# Patient Record
Sex: FEMALE | Race: WHITE | NOT HISPANIC OR LATINO | Employment: PART TIME | URBAN - METROPOLITAN AREA
[De-identification: names, ages, dates, MRNs, and addresses within clinical notes are randomized per-mention and may not be internally consistent; named-entity substitution may affect disease eponyms.]

---

## 2017-08-09 ENCOUNTER — OFFICE VISIT (OUTPATIENT)
Dept: FAMILY MEDICINE | Facility: CLINIC | Age: 27
End: 2017-08-09
Payer: COMMERCIAL

## 2017-08-09 VITALS
HEART RATE: 68 BPM | HEIGHT: 70 IN | BODY MASS INDEX: 23.62 KG/M2 | DIASTOLIC BLOOD PRESSURE: 80 MMHG | WEIGHT: 165 LBS | TEMPERATURE: 98.8 F | SYSTOLIC BLOOD PRESSURE: 120 MMHG

## 2017-08-09 DIAGNOSIS — F33.1 MODERATE EPISODE OF RECURRENT MAJOR DEPRESSIVE DISORDER (H): ICD-10-CM

## 2017-08-09 DIAGNOSIS — Z12.4 SCREENING FOR MALIGNANT NEOPLASM OF CERVIX: ICD-10-CM

## 2017-08-09 DIAGNOSIS — Z00.01 ENCOUNTER FOR ROUTINE ADULT HEALTH EXAMINATION WITH ABNORMAL FINDINGS: Primary | ICD-10-CM

## 2017-08-09 DIAGNOSIS — Z11.3 SCREENING EXAMINATION FOR VENEREAL DISEASE: ICD-10-CM

## 2017-08-09 DIAGNOSIS — Z30.41 ENCOUNTER FOR SURVEILLANCE OF CONTRACEPTIVE PILLS: ICD-10-CM

## 2017-08-09 DIAGNOSIS — F41.1 GAD (GENERALIZED ANXIETY DISORDER): ICD-10-CM

## 2017-08-09 PROCEDURE — 99385 PREV VISIT NEW AGE 18-39: CPT | Performed by: FAMILY MEDICINE

## 2017-08-09 PROCEDURE — 87389 HIV-1 AG W/HIV-1&-2 AB AG IA: CPT | Performed by: FAMILY MEDICINE

## 2017-08-09 PROCEDURE — 87591 N.GONORRHOEAE DNA AMP PROB: CPT | Performed by: FAMILY MEDICINE

## 2017-08-09 PROCEDURE — 87491 CHLMYD TRACH DNA AMP PROBE: CPT | Performed by: FAMILY MEDICINE

## 2017-08-09 PROCEDURE — 86780 TREPONEMA PALLIDUM: CPT | Performed by: FAMILY MEDICINE

## 2017-08-09 PROCEDURE — 36415 COLL VENOUS BLD VENIPUNCTURE: CPT | Performed by: FAMILY MEDICINE

## 2017-08-09 PROCEDURE — G0145 SCR C/V CYTO,THINLAYER,RESCR: HCPCS | Performed by: FAMILY MEDICINE

## 2017-08-09 RX ORDER — ESCITALOPRAM OXALATE 10 MG/1
10 TABLET ORAL DAILY
Qty: 90 TABLET | Refills: 0 | Status: SHIPPED | OUTPATIENT
Start: 2017-08-09 | End: 2017-12-06

## 2017-08-09 RX ORDER — ESCITALOPRAM OXALATE 10 MG/1
10 TABLET ORAL DAILY
COMMUNITY
End: 2017-08-09

## 2017-08-09 RX ORDER — DROSPIRENONE AND ETHINYL ESTRADIOL 0.02-3(28)
1 KIT ORAL DAILY
COMMUNITY
End: 2017-08-09

## 2017-08-09 RX ORDER — DROSPIRENONE AND ETHINYL ESTRADIOL 0.02-3(28)
1 KIT ORAL DAILY
Qty: 28 TABLET | Refills: 11 | Status: SHIPPED | OUTPATIENT
Start: 2017-08-09 | End: 2017-12-11

## 2017-08-09 ASSESSMENT — PATIENT HEALTH QUESTIONNAIRE - PHQ9
5. POOR APPETITE OR OVEREATING: SEVERAL DAYS
SUM OF ALL RESPONSES TO PHQ QUESTIONS 1-9: 9

## 2017-08-09 ASSESSMENT — ANXIETY QUESTIONNAIRES
3. WORRYING TOO MUCH ABOUT DIFFERENT THINGS: SEVERAL DAYS
GAD7 TOTAL SCORE: 5
5. BEING SO RESTLESS THAT IT IS HARD TO SIT STILL: SEVERAL DAYS
7. FEELING AFRAID AS IF SOMETHING AWFUL MIGHT HAPPEN: SEVERAL DAYS
2. NOT BEING ABLE TO STOP OR CONTROL WORRYING: NOT AT ALL
1. FEELING NERVOUS, ANXIOUS, OR ON EDGE: SEVERAL DAYS
6. BECOMING EASILY ANNOYED OR IRRITABLE: NOT AT ALL

## 2017-08-09 NOTE — PROGRESS NOTES
SUBJECTIVE:   CC: Tiffanie ZIMMERMAN is an 27 year old woman who presents for preventive health visit.     Physical   Annual:     Getting at least 3 servings of Calcium per day::  Yes    Bi-annual eye exam::  NO    Dental care twice a year::  NO    Sleep apnea or symptoms of sleep apnea::  None    Diet::  Regular (no restrictions)    Frequency of exercise::  2-3 days/week    Duration of exercise::  30-45 minutes    Taking medications regularly::  Yes    Medication side effects::  Not applicable    Additional concerns today::  No    Pap smear done on this date: 5/27/16 (approximately), by this group: [facility], results were Normal.     hpv history , when she was younger, since then normal pap  No other history of contraceptive    ocp since last year    History of depression and anxiety and was switched to lexapro from zoloft for few years and had been doing well on current dose  No side effects  Newly single ,      - canker sore?   -STD Screening    Today's PHQ-2 Score:   PHQ-2 ( 1999 Pfizer) 8/9/2017   Q1: Little interest or pleasure in doing things 1   Q2: Feeling down, depressed or hopeless 1   PHQ-2 Score 2   Q1: Little interest or pleasure in doing things Several days   Q2: Feeling down, depressed or hopeless Several days   PHQ-2 Score 2       Abuse: Current or Past(Physical, Sexual or Emotional)- No  Do you feel safe in your environment - Yes    Social History   Substance Use Topics     Smoking status: Never Smoker     Smokeless tobacco: Never Used     Alcohol use Yes      Comment: 1 glass of wine daily     The patient does not drink >3 drinks per day nor >7 drinks per week.    Reviewed orders with patient.  Reviewed health maintenance and updated orders accordingly - Yes  Labs reviewed in EPIC    Mammogram not appropriate for this patient based on age.    Pertinent mammograms are reviewed under the imaging tab.  History of abnormal Pap smear: NO - age 21-29 PAP every 3 years  "recommended    Reviewed and updated as needed this visit by clinical staff  Tobacco  Allergies  Meds  Med Hx  Surg Hx  Fam Hx  Soc Hx        Reviewed and updated as needed this visit by Provider        History reviewed. No pertinent past medical history.   History reviewed. No pertinent surgical history.  Obstetric History     No data available          ROS:  C: NEGATIVE for fever, chills, change in weight  I: NEGATIVE for worrisome rashes, moles or lesions  E: NEGATIVE for vision changes or irritation  ENT: NEGATIVE for ear, mouth and throat problems  R: NEGATIVE for significant cough or SOB  B: NEGATIVE for masses, tenderness or discharge  CV: NEGATIVE for chest pain, palpitations or peripheral edema  GI: NEGATIVE for nausea, abdominal pain, heartburn, or change in bowel habits  : NEGATIVE for unusual urinary or vaginal symptoms. Periods are regular.  M: NEGATIVE for significant arthralgias or myalgia  N: NEGATIVE for weakness, dizziness or paresthesias  P: NEGATIVE for changes in mood or affect     OBJECTIVE:   /80 (BP Location: Right arm, Patient Position: Chair, Cuff Size: Adult Regular)  Pulse 68  Temp 98.8  F (37.1  C) (Oral)  Ht 5' 10\" (1.778 m)  Wt 165 lb (74.8 kg)  BMI 23.68 kg/m2  EXAM:  GENERAL: healthy, alert and no distress  EYES: Eyes grossly normal to inspection, PERRL and conjunctivae and sclerae normal  HENT: ear canals and TM's normal, nose and mouth without ulcers or lesions  NECK: no adenopathy, no asymmetry, masses, or scars and thyroid normal to palpation  RESP: lungs clear to auscultation - no rales, rhonchi or wheezes  BREAST: normal without masses, tenderness or nipple discharge and no palpable axillary masses or adenopathy  CV: regular rate and rhythm, normal S1 S2, no S3 or S4, no murmur, click or rub, no peripheral edema and peripheral pulses strong  ABDOMEN: soft, nontender, no hepatosplenomegaly, no masses and bowel sounds normal   (female): normal female external " "genitalia, normal urethral meatus, vaginal mucosa pink, moist, well rugated, and normal cervix/adnexa/uterus without masses or discharge  MS: no gross musculoskeletal defects noted, no edema  SKIN: no suspicious lesions or rashes  NEURO: Normal strength and tone, mentation intact and speech normal  PSYCH: mentation appears normal, affect normal/bright    ASSESSMENT/PLAN:       ICD-10-CM    1. Encounter for routine adult health examination with abnormal findings Z00.01    2. Moderate episode of recurrent major depressive disorder (H) F33.1 escitalopram (LEXAPRO) 10 MG tablet   3. JOSÉ MIGUEL (generalized anxiety disorder) F41.1 escitalopram (LEXAPRO) 10 MG tablet   4. Encounter for surveillance of contraceptive pills Z30.41 drospirenone-ethinyl estradiol (VESTURA) 3-0.02 MG per tablet   5. Screening for malignant neoplasm of cervix Z12.4 Pap imaged thin layer screen reflex to HPV if ASCUS - recommend age 25 - 29   6. Screening examination for venereal disease Z11.3 HIV Antigen Antibody Combo     Anti Treponema     Chlamydia trachomatis PCR     Neisseria gonorrhoeae PCR     New patient to this clinic/provider  History of depression/anxiety -reviewed risks and benefits of meds, advised therapy, declined for now, she would likre refills, reviewed potential risks of med  Refilled. Advised vit D  Std testing done  Fasting next year      COUNSELING:  Reviewed preventive health counseling, as reflected in patient instructions       reports that she has never smoked. She has never used smokeless tobacco.    Estimated body mass index is 23.68 kg/(m^2) as calculated from the following:    Height as of this encounter: 5' 10\" (1.778 m).    Weight as of this encounter: 165 lb (74.8 kg).         Counseling Resources:  ATP IV Guidelines  Pooled Cohorts Equation Calculator  Breast Cancer Risk Calculator  FRAX Risk Assessment  ICSI Preventive Guidelines  Dietary Guidelines for Americans, 2010  USDA's MyPlate  ASA Prophylaxis  Lung CA " Screening    Marj Galvan [facility]

## 2017-08-09 NOTE — Clinical Note
Please abstract the following data from this visit with this patient into the appropriate field in Epic:  Pap smear done on this date: 5/27/16 (approximately), by this group: [facility], results were normal.

## 2017-08-09 NOTE — MR AVS SNAPSHOT
After Visit Summary   8/9/2017    Tiffanie ZIMMERMAN    MRN: [id number]           Patient Information     Date Of Birth          1990        Visit Information        Provider Department      8/9/2017 5:00 PM Marj Galvan DO [facility]        Today's Diagnoses     Screening for malignant neoplasm of cervix    -  1    Moderate episode of recurrent major depressive disorder (H)        JOSÉ MIGUEL (generalized anxiety disorder)        Encounter for surveillance of contraceptive pills        Screening examination for venereal disease          Care Instructions      Preventive Health Recommendations  Female Ages 26 - 39  Yearly exam:   See your health care provider every year in order to    Review health changes.     Discuss preventive care.      Review your medicines if you your doctor has prescribed any.    Until age 30: Get a Pap test every three years (more often if you have had an abnormal result).    After age 30: Talk to your doctor about whether you should have a Pap test every 3 years or have a Pap test with HPV screening every 5 years.   You do not need a Pap test if your uterus was removed (hysterectomy) and you have not had cancer.  You should be tested each year for STDs (sexually transmitted diseases), if you're at risk.   Talk to your provider about how often to have your cholesterol checked.  If you are at risk for diabetes, you should have a diabetes test (fasting glucose).  Shots: Get a flu shot each year. Get a tetanus shot every 10 years.   Nutrition:     Eat at least 5 servings of fruits and vegetables each day.    Eat whole-grain bread, whole-wheat pasta and brown rice instead of white grains and rice.    Talk to your provider about Calcium and Vitamin D.     Lifestyle    Exercise at least 150 minutes a week (30 minutes a day, 5 days of the week). This will help you control your weight and prevent disease.    Limit alcohol to one drink per day.    No smoking.      Wear sunscreen to prevent skin cancer.    See your dentist every six months for an exam and cleaning.    [facility]   Discharged by : Reshma COREA, Certified Medical Assistant (AAMA)August 9, 2017 5:34 PM    Paper scripts provided to patient : none   If you have any questions regarding to your visit please contact your care team:   Team Gold Clinic Hours Telephone Number   Dr. Reshma Esparza PA-C   7am-7pm Monday - Thursday   7am-5pm Fridays  [phone]   (Appointment scheduling available 24/7)   RN Line   [phone] option 2     What options do I have for visits at the clinic other than the traditional office visit?   To expand how we care for you, many of our providers are utilizing electronic visits (e-visits) and telephone visits, when medically appropriate, for interactions with their patients rather than a visit in the clinic. We also offer nurse visits for many medical concerns. Just like any other service, we will bill your insurance company for this type of visit based on time spent on the phone with your provider. Not all insurance companies cover these visits. Please check with your medical insurance if this type of visit is covered. You will be responsible for any charges that are not paid by your insurance.   E-visits via BigRep: generally incur a $35.00 fee.   Telephone visits:   Time spent on the phone: *charged based on time that is spent on the phone in increments of 10 minutes. Estimated cost:   5-10 mins $30.00   11-20 mins. $59.00   21-30 mins. $85.00   Use BigRep (secure email communication and access to your chart) to send your primary care provider a message or make an appointment. Ask someone on your Team how to sign up for BigRep.   For a Price Quote for your services, please call our Consumer Price Line at [phone].   As always, Thank you for trusting us with your health care  needs!                    [facility]:    Scheduling Appointments  [facility]  Call: [phone]    [facility]  Call: [phone]    [facility]  Call: [phone]    WHERE TO GO FOR CARE?    Clinic    Make an appointment if you:       Are sick (cold, cough, flu, sore throat, earache or in pain).       Have a small injury (sprain, small cut, burn or broken bone).       Need a physical exam, Pap smear, vaccine or prescription refill.       Have questions about your health or medicines.    To reach us:      Call [phone] ([phone]). Open 24 hours every day. (For counseling services, call [phone].)    Log into MOBEXO at [employer]. (Visit [email or web address] to create an account.) Hospital emergency room    An emergency is a serious or life- threatening problem that must be treated right away.    Call 419 or get to the hospital if you have:      Very bad or sudden:            - Chest pain or pressure         - Bleeding         - Head or belly pain         - Dizziness or trouble seeing, walking or                          Speaking      Problems breathing      Blood in your vomit or you are coughing up blood      A major injury (knocked out, loss of a finger or limb, rape, broken bone protruding from skin)    A mental health crisis. (Or call the Mental Health Crisis line at 1-618.191.1762 or Suicide Prevention Hotline at 1-385.356.5935.)    Open 24 hours every day. You don't need an appointment.     Urgent care    Visit urgent care for sickness or small injuries when the clinic is closed. You don't need an appointment. To check hours or find an urgent care near you, visit [email or web address]. Online care    Get online care from [email or web address] for more than 70 common problems, like colds, allergies and infections. Open 24 hours every day at: [email or web address]   Need help deciding?    For advice  "about where to be seen, you may call your clinic and ask to speak with a nurse. We're here for you 24 hours every day.         If you are deaf or hard of hearing, please let us know. We provide many free services including sign language interpreters, oral interpreters, TTYs, telephone amplifiers, note takers and written materials.                 Follow-ups after your visit        Who to contact     If you have questions or need follow up information about today's clinic visit or your schedule please contact [facility] directly at [phone].  Normal or non-critical lab and imaging results will be communicated to you by Ink361hart, letter or phone within 4 business days after the clinic has received the results. If you do not hear from us within 7 days, please contact the clinic through [employer] or phone. If you have a critical or abnormal lab result, we will notify you by phone as soon as possible.  Submit refill requests through [employer] or call your pharmacy and they will forward the refill request to us. Please allow 3 business days for your refill to be completed.          Additional Information About Your Visit        Ink361hart Information     [employer] gives you secure access to your electronic health record. If you see a primary care provider, you can also send messages to your care team and make appointments. If you have questions, please call your primary care clinic.  If you do not have a primary care provider, please call [phone] and they will assist you.        Care EveryWhere ID     This is your Care EveryWhere ID. This could be used by other organizations to access your [place] medical records  [id number]        Your Vitals Were     Pulse Temperature Height BMI (Body Mass Index)          68 98.8  F (37.1  C) (Oral) 5' 10\" (1.778 m) 23.68 kg/m2         Blood Pressure from Last 3 Encounters:   08/09/17 120/80    Weight from Last 3 Encounters:   08/09/17 165 lb (74.8 kg)            "   We Performed the Following     Anti Treponema     Chlamydia trachomatis PCR     HIV Antigen Antibody Combo     Neisseria gonorrhoeae PCR     Pap imaged thin layer screen reflex to HPV if ASCUS - recommend age 25 - 29          Where to get your medicines      These medications were [id number] [place] [place] [facility] [address] AT [facility] [address]  [address], [place] [address]    Hours:  24-hours Phone:  [phone]     drospirenone-ethinyl estradiol 3-0.02 MG per tablet    escitalopram 10 MG tablet          Primary Care Provider    None Specified       No primary provider on file.        Equal Access to Services     [facility]: Hadii ghulam Meléndez, waaxda luzairaadaha, qaybta kaalmada roseanne, yoselin watkins . So [facility] [phone].    ATENCIÓN: Si habla español, tiene a oconnor disposición servicios gratuitos de asistencia lingüística. Llame al [phone].    We comply with applicable federal civil rights laws and [place] laws. We do not discriminate on the basis of race, color, national origin, age, disability sex, sexual orientation or gender identity.            Thank you!     Thank you for choosing [facility]  for your care. Our goal is always to provide you with excellent care. Hearing back from our patients is one way we can continue to improve our services. Please take a few minutes to complete the written survey that you may receive in the mail after your visit with us. Thank you!             Your Updated Medication List - Protect others around you: Learn how to safely use, store and throw away your medicines at www.disposemymeds.org.          This list is accurate as of: 8/9/17  5:34 PM.  Always use your most recent med list.                   Brand Name Dispense Instructions for use Diagnosis    drospirenone-ethinyl estradiol 3-0.02 MG per tablet    VESTURA    28 tablet    Take 1 tablet by mouth  daily    Encounter for surveillance of contraceptive pills       escitalopram 10 MG tablet    LEXAPRO    90 tablet    Take 1 tablet (10 mg) by mouth daily    Moderate episode of recurrent major depressive disorder (H), JOSÉ MIGUEL (generalized anxiety disorder)

## 2017-08-09 NOTE — PATIENT INSTRUCTIONS
Preventive Health Recommendations  Female Ages 26 - 39  Yearly exam:   See your health care provider every year in order to    Review health changes.     Discuss preventive care.      Review your medicines if you your doctor has prescribed any.    Until age 30: Get a Pap test every three years (more often if you have had an abnormal result).    After age 30: Talk to your doctor about whether you should have a Pap test every 3 years or have a Pap test with HPV screening every 5 years.   You do not need a Pap test if your uterus was removed (hysterectomy) and you have not had cancer.  You should be tested each year for STDs (sexually transmitted diseases), if you're at risk.   Talk to your provider about how often to have your cholesterol checked.  If you are at risk for diabetes, you should have a diabetes test (fasting glucose).  Shots: Get a flu shot each year. Get a tetanus shot every 10 years.   Nutrition:     Eat at least 5 servings of fruits and vegetables each day.    Eat whole-grain bread, whole-wheat pasta and brown rice instead of white grains and rice.    Talk to your provider about Calcium and Vitamin D.     Lifestyle    Exercise at least 150 minutes a week (30 minutes a day, 5 days of the week). This will help you control your weight and prevent disease.    Limit alcohol to one drink per day.    No smoking.     Wear sunscreen to prevent skin cancer.    See your dentist every six months for an exam and cleaning.    [facility]   Discharged by : Reshma COREA Certified Medical Assistant (AAMA)August 9, 2017 5:34 PM    Paper scripts provided to patient : none   If you have any questions regarding to your visit please contact your care team:   Team Gold Clinic Hours Telephone Number   Dr. Reshma Esparza PA-C   7am-7pm Monday - Thursday   7am-5pm Fridays  [phone]   (Appointment scheduling available 24/7)   RN Line   [phone] option  2     What options do I have for visits at the clinic other than the traditional office visit?   To expand how we care for you, many of our providers are utilizing electronic visits (e-visits) and telephone visits, when medically appropriate, for interactions with their patients rather than a visit in the clinic. We also offer nurse visits for many medical concerns. Just like any other service, we will bill your insurance company for this type of visit based on time spent on the phone with your provider. Not all insurance companies cover these visits. Please check with your medical insurance if this type of visit is covered. You will be responsible for any charges that are not paid by your insurance.   E-visits via Berg: generally incur a $35.00 fee.   Telephone visits:   Time spent on the phone: *charged based on time that is spent on the phone in increments of 10 minutes. Estimated cost:   5-10 mins $30.00   11-20 mins. $59.00   21-30 mins. $85.00   Use [employer]osst (secure email communication and access to your chart) to send your primary care provider a message or make an appointment. Ask someone on your Team how to sign up for Berg.   For a Price Quote for your services, please call our Consumer Price Line at [phone].   As always, Thank you for trusting us with your health care needs!                    [facility]:    Scheduling Appointments  [place]  Call: [phone]    [facility]  Call: [phone]    [facility]  Call: [phone]    WHERE TO GO FOR CARE?    Clinic    Make an appointment if you:       Are sick (cold, cough, flu, sore throat, earache or in pain).       Have a small injury (sprain, small cut, burn or broken bone).       Need a physical exam, Pap smear, vaccine or prescription refill.       Have questions about your health or medicines.    To reach us:      Call [phone]  ([phone]). Open 24 hours every day. (For counseling services, call [phone].)    Log into [employer] at [employer]. (Visit Spotted[employer] to create an account.) Hospital emergency room    An emergency is a serious or life- threatening problem that must be treated right away.    Call 911 or get to the hospital if you have:      Very bad or sudden:            - Chest pain or pressure         - Bleeding         - Head or belly pain         - Dizziness or trouble seeing, walking or                          Speaking      Problems breathing      Blood in your vomit or you are coughing up blood      A major injury (knocked out, loss of a finger or limb, rape, broken bone protruding from skin)    A mental health crisis. (Or call the Mental Health Crisis line at 1-659.273.4266 or Suicide Prevention Hotline at 1-709.629.1026.)    Open 24 hours every day. You don't need an appointment.     Urgent care    Visit urgent care for sickness or small injuries when the clinic is closed. You don't need an appointment. To check hours or find an urgent care near you, visit [email or web address]. Online care    Get online care from OnCare.org for more than 70 common problems, like colds, allergies and infections. Open 24 hours every day at: [email or web address]   Need help deciding?    For advice about where to be seen, you may call your clinic and ask to speak with a nurse. We're here for you 24 hours every day.         If you are deaf or hard of hearing, please let us know. We provide many free services including sign language interpreters, oral interpreters, TTYs, telephone amplifiers, note takers and written materials.

## 2017-08-09 NOTE — NURSING NOTE
"Chief Complaint   Patient presents with     Physical       Initial /80 (BP Location: Right arm, Patient Position: Chair, Cuff Size: Adult Regular)  Pulse 68  Temp 98.8  F (37.1  C) (Oral)  Ht 5' 10\" (1.778 m)  Wt 165 lb (74.8 kg)  BMI 23.68 kg/m2 Estimated body mass index is 23.68 kg/(m^2) as calculated from the following:    Height as of this encounter: 5' 10\" (1.778 m).    Weight as of this encounter: 165 lb (74.8 kg).  Medication Reconciliation: complete    "

## 2017-08-10 LAB — HIV 1+2 AB+HIV1 P24 AG SERPL QL IA: NORMAL

## 2017-08-10 ASSESSMENT — ANXIETY QUESTIONNAIRES: GAD7 TOTAL SCORE: 5

## 2017-08-11 LAB
C TRACH DNA SPEC QL NAA+PROBE: NORMAL
N GONORRHOEA DNA SPEC QL NAA+PROBE: NORMAL
SPECIMEN SOURCE: NORMAL
SPECIMEN SOURCE: NORMAL
T PALLIDUM IGG+IGM SER QL: NEGATIVE

## 2017-08-14 LAB
COPATH REPORT: NORMAL
PAP: NORMAL

## 2017-08-26 ENCOUNTER — HEALTH MAINTENANCE LETTER (OUTPATIENT)
Age: 27
End: 2017-08-26

## 2017-12-06 DIAGNOSIS — F41.1 GAD (GENERALIZED ANXIETY DISORDER): ICD-10-CM

## 2017-12-06 DIAGNOSIS — F33.1 MODERATE EPISODE OF RECURRENT MAJOR DEPRESSIVE DISORDER (H): ICD-10-CM

## 2017-12-06 NOTE — TELEPHONE ENCOUNTER
escitalopram (LEXAPRO) 10 MG tablet   10 mg, DAILY 0 ordered  Edit     Summary: Take 1 tablet (10 mg) by mouth daily, Disp-90 tablet, R-0, E-Prescribe   Dose, Route, Frequency: 10 mg, Oral, DAILY  Start: 8/9/2017  Ord/Sold: 8/9/2017 (O)  Report  Taking:   Long-term:   Pharmacy: [facility] [address] [place] [address] [address] [facility] [address]  Med Dose History       Patient Sig: Take 1 tablet (10 mg) by mouth daily       Ordered on: 8/9/2017       Authorized by: MERCEDES SAGASTUME       Dispense: 90 tablet        LOV:8/9/2017

## 2017-12-11 ENCOUNTER — MYC MEDICAL ADVICE (OUTPATIENT)
Dept: FAMILY MEDICINE | Facility: CLINIC | Age: 27
End: 2017-12-11

## 2017-12-11 ENCOUNTER — MYC REFILL (OUTPATIENT)
Dept: FAMILY MEDICINE | Facility: CLINIC | Age: 27
End: 2017-12-11

## 2017-12-11 DIAGNOSIS — F33.1 MODERATE EPISODE OF RECURRENT MAJOR DEPRESSIVE DISORDER (H): ICD-10-CM

## 2017-12-11 DIAGNOSIS — F41.1 GAD (GENERALIZED ANXIETY DISORDER): ICD-10-CM

## 2017-12-11 DIAGNOSIS — Z30.41 ENCOUNTER FOR SURVEILLANCE OF CONTRACEPTIVE PILLS: ICD-10-CM

## 2017-12-11 RX ORDER — ESCITALOPRAM OXALATE 10 MG/1
10 TABLET ORAL DAILY
Qty: 90 TABLET | Refills: 0 | Status: CANCELLED | OUTPATIENT
Start: 2017-12-11

## 2017-12-11 RX ORDER — DROSPIRENONE AND ETHINYL ESTRADIOL 0.02-3(28)
1 KIT ORAL DAILY
Qty: 84 TABLET | Refills: 2 | Status: SHIPPED | OUTPATIENT
Start: 2017-12-11 | End: 2018-08-25

## 2017-12-11 RX ORDER — ESCITALOPRAM OXALATE 10 MG/1
10 TABLET ORAL DAILY
Qty: 90 TABLET | Refills: 1 | Status: SHIPPED | OUTPATIENT
Start: 2017-12-11 | End: 2018-09-06

## 2017-12-11 RX ORDER — ESCITALOPRAM OXALATE 10 MG/1
TABLET ORAL
Qty: 90 TABLET | Refills: 0 | Status: SHIPPED | OUTPATIENT
Start: 2017-12-11 | End: 2017-12-11

## 2017-12-11 NOTE — TELEPHONE ENCOUNTER
Prescription approved per [facility] Refill Protocol. Resent birth control with a different quantity. Sent MyChart.  Ines Holloway RN

## 2017-12-11 NOTE — TELEPHONE ENCOUNTER
Route to refill depression meds for one year from visit date of 8/9 per patient's request.  Ines Holloway RN

## 2017-12-11 NOTE — TELEPHONE ENCOUNTER
Prescription approved per [facility] Refill Protocol.  Ines Holloway RN

## 2018-08-25 ENCOUNTER — TELEPHONE (OUTPATIENT)
Dept: FAMILY MEDICINE | Facility: CLINIC | Age: 28
End: 2018-08-25

## 2018-08-25 DIAGNOSIS — Z30.41 ENCOUNTER FOR SURVEILLANCE OF CONTRACEPTIVE PILLS: ICD-10-CM

## 2018-08-27 RX ORDER — DROSPIRENONE AND ETHINYL ESTRADIOL TABLETS 0.02-3(28)
KIT ORAL
Qty: 28 TABLET | Refills: 0 | Status: SHIPPED | OUTPATIENT
Start: 2018-08-27 | End: 2018-08-28

## 2018-08-27 NOTE — TELEPHONE ENCOUNTER
"Requested Prescriptions   Pending Prescriptions Disp Refills     LORYNA 3-0.02 MG per tablet [Pharmacy Med Name: LORYNA 3MG/0.02MG TABLETS 28'S]  Last Written Prescription Date:  12/11/2017  Last Fill Quantity: 84 tablet,  # refills: 2   Last office visit: 8/9/2017 with prescribing provider:  PEREZ Galvan   Future Office Visit:     84 tablet 0     Sig: TAKE 1 TABLET BY MOUTH DAILY    Contraceptives Protocol Failed    8/25/2018 10:06 AM       Failed - Recent (12 mo) or future (30 days) visit within the authorizing provider's specialty    Patient had office visit in the last 12 months or has a visit in the next 30 days with authorizing provider or within the authorizing provider's specialty.  See \"Patient Info\" tab in inbasket, or \"Choose Columns\" in Meds & Orders section of the refill encounter.           Passed - Patient is not a current smoker if age is 35 or older       Passed - No active pregnancy on record       Passed - No positive pregnancy test in past 12 months          "

## 2018-08-27 NOTE — TELEPHONE ENCOUNTER
Rayne given x1 today. Patient needs office visit, please schedule. Patient is due for annual visit.    Thanks!  Manuel Carvalho RN

## 2018-08-28 ENCOUNTER — OFFICE VISIT (OUTPATIENT)
Dept: FAMILY MEDICINE | Facility: CLINIC | Age: 28
End: 2018-08-28
Payer: COMMERCIAL

## 2018-08-28 VITALS
SYSTOLIC BLOOD PRESSURE: 106 MMHG | RESPIRATION RATE: 18 BRPM | DIASTOLIC BLOOD PRESSURE: 74 MMHG | HEART RATE: 67 BPM | WEIGHT: 161.2 LBS | OXYGEN SATURATION: 99 % | TEMPERATURE: 97.6 F | BODY MASS INDEX: 23.13 KG/M2

## 2018-08-28 DIAGNOSIS — H10.31 ACUTE CONJUNCTIVITIS OF RIGHT EYE, UNSPECIFIED ACUTE CONJUNCTIVITIS TYPE: ICD-10-CM

## 2018-08-28 DIAGNOSIS — Z30.41 ENCOUNTER FOR SURVEILLANCE OF CONTRACEPTIVE PILLS: Primary | ICD-10-CM

## 2018-08-28 PROCEDURE — 99213 OFFICE O/P EST LOW 20 MIN: CPT | Performed by: INTERNAL MEDICINE

## 2018-08-28 RX ORDER — DROSPIRENONE AND ETHINYL ESTRADIOL 0.02-3(28)
1 KIT ORAL DAILY
Qty: 28 TABLET | Refills: 11 | Status: SHIPPED | OUTPATIENT
Start: 2018-08-28 | End: 2019-03-11

## 2018-08-28 RX ORDER — TOBRAMYCIN 3 MG/ML
1 SOLUTION/ DROPS OPHTHALMIC EVERY 4 HOURS
Qty: 1 BOTTLE | Refills: 0 | Status: SHIPPED | OUTPATIENT
Start: 2018-08-28 | End: 2018-09-04

## 2018-08-28 ASSESSMENT — ENCOUNTER SYMPTOMS: EYE PAIN: 1

## 2018-08-28 NOTE — PROGRESS NOTES
This patient was initially scheduled for a complete physical exam but we did not do a complete physical exam today. This was a problem focused visit for    1. Evaluation of possible pink eye with right eye  2. Refill of birth control      See assessment and plan section  . None of this complete physical exam visit was performed and patient did not want this visit to be a complete physical exam. She's  in excellent health and declines any screening  Laboratory studies and had her pap and pelvic examination last year      HPI        Today's PHQ-2 Score:   PHQ-2 ( 1999 [employer]) 8/28/2018   Q1: Little interest or pleasure in doing things 0   Q2: Feeling down, depressed or hopeless 1   PHQ-2 Score 1   Q1: Little interest or pleasure in doing things Not at all   Q2: Feeling down, depressed or hopeless Several days   PHQ-2 Score 1       Abuse: Current or Past(Physical, Sexual or Emotional)- No  Do you feel safe in your environment - No    Social History   Substance Use Topics     Smoking status: Never Smoker     Smokeless tobacco: Never Used     Alcohol use Yes      Comment: 1 glass of wine daily     Alcohol Use 8/28/2018   If you drink alcohol do you typically have greater than 3 drinks per day OR greater than 7 drinks per week? No   No flowsheet data found.    Reviewed orders with patient.  Reviewed health maintenance and updated orders accordingly -       Mammogram not appropriate for this patient based on age.    Pertinent mammograms are reviewed under the imaging tab.  History of abnormal Pap smear:   PAP / HPV 8/9/2017   PAP NIL     Reviewed and updated as needed this visit by clinical staff         Reviewed and updated as needed this visit by Provider            Review of Systems   Eyes: Positive for pain.        OBJECTIVE:   /74  Pulse 67  Temp 97.6  F (36.4  C) (Oral)  Resp 18  Wt 161 lb 3.2 oz (73.1 kg)  SpO2 99%  BMI 23.13 kg/m2  Physical Exam      Diagnostic Test Results:  none     ASSESSMENT/PLAN:    (Z30.41) Encounter for surveillance of contraceptive pills  (primary encounter diagnosis)  Comment: no new issues to discuss, patient tolerates birth control  And needs a refill   Plan: drospirenone-ethinyl estradiol (LORYNA) 3-0.02         MG per tablet            (H10.31) Acute conjunctivitis of right eye, unspecified acute conjunctivitis type  Comment: patient has a history of eye lash extensions and possibly has had this as a source of some eye irritation. Her concerns revolve around some pinkish eye glued shut with goopy material mainly this am. She's had this condition before to the point that she did see an ophthalmologist she says some months ago and has some recurrent eye symptoms. The exam was showing some nonspecific minor reddish irritation with the conjunctiva of right eye. Not consistent with a full blown bacterial pink eye and more of an irritant conjunctivitis. She says she was told she had recurrent viral conjunctivitis. At this point I recommended proper eye lubrication with use of Visine eye drops and TOBRAMYCIN eye drops just for the next few days . If we don't get this condition to an improvement, further eye care with ophthalmologist will ne necessary as this is a recurrent problem. Patient understands and will follow up with ophthalmologist if persistent eye symptoms    Plan: tobramycin (TOBREX) 0.3 % ophthalmic solution

## 2018-08-28 NOTE — PATIENT INSTRUCTIONS
[facility]    If you have any questions regarding to your visit please contact your care team:     Team Pink:   Clinic Hours Telephone Number   Internal Medicine:  Dr. Tesha Murrell NP       7am-7pm  Monday - Thursday   7am-5pm  Fridays  [phone]  (Appointment scheduling available 24/7)    Questions about your recent visit?  Team Line  [phone]   Urgent Care - [place] and [facility] - 11am-9pm Monday-Friday Saturday-Sunday- 9am-5pm   [place] - 5pm-9pm Monday-Friday Saturday-Sunday- 9am-5pm  [phone] - Ileana Hand  [phone] - [place]       What options do I have for a visit other than an office visit? We offer electronic visits (e-visits) and telephone visits, when medically appropriate.  Please check with your medical insurance to see if these types of visits are covered, as you will be responsible for any charges that are not paid by your insurance.      You can use Whisk (formerly Zypsee) (secure electronic communication) to access to your chart, send your primary care provider a message, or make an appointment. Ask a team member how to get started.     For a price quote for your services, please call our Consumer Price Line at [phone] or our Imaging Cost estimation line at [phone] (for imaging tests).  Discharged by Missy MORATAYA CMA ([facility])

## 2018-08-28 NOTE — MR AVS SNAPSHOT
After Visit Summary   8/28/2018    Tiffanie ZIMMERMAN    MRN: [id number]           Patient Information     Date Of Birth          1990        Visit Information        Provider Department      8/28/2018 11:10 AM Catlaino Lara MD [facility]        Today's Diagnoses     Encounter for surveillance of contraceptive pills    -  1    Acute conjunctivitis of right eye, unspecified acute conjunctivitis type          Care Instructions    [facility]    If you have any questions regarding to your visit please contact your care team:     Team Pink:   Clinic Hours Telephone Number   Internal Medicine:  Dr. Tesha Murrell NP       7am-7pm  Monday - Thursday   7am-5pm  Fridays  [phone]  (Appointment scheduling available 24/7)    Questions about your recent visit?  Team Line  [phone]   Urgent Care - [place] and [facility] - 11am-9pm Monday-Friday Saturday-Sunday- 9am-5pm   [place] - 5pm-9pm Monday-Friday Saturday-Sunday- 9am-5pm  [phone] - [place]  [phone] - [place]       What options do I have for a visit other than an office visit? We offer electronic visits (e-visits) and telephone visits, when medically appropriate.  Please check with your medical insurance to see if these types of visits are covered, as you will be responsible for any charges that are not paid by your insurance.      You can use InteKrin (secure electronic communication) to access to your chart, send your primary care provider a message, or make an appointment. Ask a team member how to get started.     For a price quote for your services, please call our Consumer Price Line at [phone] or our Imaging Cost estimation line at [phone] (for imaging tests).  Discharged by Missy MORATAYA CMA ([facility])            Follow-ups after your visit        Your next 10 appointments already scheduled     Sep 05, 2018  3:15 PM CDT   [facility]  Annual Physical with Giovanni Salomon MD   [facility] ([facility])    [address]  [place] [address] [phone]              Who to contact     If you have questions or need follow up information about today's clinic visit or your schedule please contact [facility] directly at [phone].  Normal or non-critical lab and imaging results will be communicated to you by RF nanohart, letter or phone within 4 business days after the clinic has received the results. If you do not hear from us within 7 days, please contact the clinic through RF nanohart or phone. If you have a critical or abnormal lab result, we will notify you by phone as soon as possible.  Submit refill requests through Jamglue or call your pharmacy and they will forward the refill request to us. Please allow 3 business days for your refill to be completed.          Additional Information About Your Visit        RF nanoharSparxent Information     Jamglue gives you secure access to your electronic health record. If you see a primary care provider, you can also send messages to your care team and make appointments. If you have questions, please call your primary care clinic.  If you do not have a primary care provider, please call [phone] and they will assist you.        Care EveryWhere ID     This is your Care EveryWhere ID. This could be used by other organizations to access your [place] medical records  [id number]        Your Vitals Were     Pulse Temperature Respirations Pulse Oximetry BMI (Body Mass Index)       67 97.6  F (36.4  C) (Oral) 18 99% 23.13 kg/m2        Blood Pressure from Last 3 Encounters:   08/28/18 106/74   08/09/17 120/80    Weight from Last 3 Encounters:   08/28/18 161 lb 3.2 oz (73.1 kg)   08/09/17 165 lb (74.8 kg)              Today, you had the following     No orders found for display         Today's Medication Changes          These changes are accurate as of 8/28/18 11:34  AM.  If you have any questions, ask your nurse or doctor.               Start taking these medicines.        Dose/Directions    tobramycin 0.3 % ophthalmic solution   Commonly known as:  TOBREX   Used for:  Acute conjunctivitis of right eye, unspecified acute conjunctivitis type   Started by:  Catalino Lara MD        Dose:  1 drop   Apply 1 drop to eye every 4 hours for 7 days   Quantity:  1 Bottle   Refills:  0            Where to get your medicines      These medications were sent to [facility] [address] [place] [address] [address] AT [facility] [address]  [address], [facility] [address]     Phone:  [phone]     drospirenone-ethinyl estradiol 3-0.02 MG per tablet    tobramycin 0.3 % ophthalmic solution                Primary Care Provider Fax #    Physician No Ref-Primary [phone]       No address on file        Equal Access to Services     [facility] : Hadii aad ku hadasho Sosandip, waaxda luqadaha, qaybta kaalmada adeegyada, yoselin watkins . So [facility] [phone].    ATENCIÓN: Si habla español, tiene a oconnor disposición servicios gratuitos de asistencia lingüística. Erma al [phone].    We comply with applicable federal civil rights laws and [place] laws. We do not discriminate on the basis of race, color, national origin, age, disability, sex, sexual orientation, or gender identity.            Thank you!     Thank you for choosing [facility]  for your care. Our goal is always to provide you with excellent care. Hearing back from our patients is one way we can continue to improve our services. Please take a few minutes to complete the written survey that you may receive in the mail after your visit with us. Thank you!             Your Updated Medication List - Protect others around you: Learn how to safely use, store and throw away your medicines at www.disposemymeds.org.          This list is accurate as of 8/28/18 11:34  AM.  Always use your most recent med list.                   Brand Name Dispense Instructions for use Diagnosis    drospirenone-ethinyl estradiol 3-0.02 MG per tablet    LORYNA    28 tablet    Take 1 tablet by mouth daily    Encounter for surveillance of contraceptive pills       escitalopram 10 MG tablet    LEXAPRO    90 tablet    Take 1 tablet (10 mg) by mouth daily    Moderate episode of recurrent major depressive disorder (H), JOSÉ MIGUEL (generalized anxiety disorder)       tobramycin 0.3 % ophthalmic solution    TOBREX    1 Bottle    Apply 1 drop to eye every 4 hours for 7 days    Acute conjunctivitis of right eye, unspecified acute conjunctivitis type

## 2018-08-29 ASSESSMENT — PATIENT HEALTH QUESTIONNAIRE - PHQ9: SUM OF ALL RESPONSES TO PHQ QUESTIONS 1-9: 0

## 2018-09-06 DIAGNOSIS — F33.1 MODERATE EPISODE OF RECURRENT MAJOR DEPRESSIVE DISORDER (H): ICD-10-CM

## 2018-09-06 DIAGNOSIS — F41.1 GAD (GENERALIZED ANXIETY DISORDER): ICD-10-CM

## 2018-09-06 NOTE — TELEPHONE ENCOUNTER
"Requested Prescriptions   Pending Prescriptions Disp Refills     escitalopram (LEXAPRO) 10 MG tablet [Pharmacy Med Name: ESCITALOPRAM 10MG TABLETS]  Last Written Prescription Date:  12/11/2017  Last Fill Quantity: 90 tabs,  # refills: 1   Last office visit: 8/28/2018 with prescribing provider:  Marco   Future Office Visit:     90 tablet 0     Sig: TAKE 1 TABLET(10 MG) BY MOUTH DAILY    SSRIs Protocol Passed    9/6/2018  5:18 PM   PHQ-9 SCORE 8/9/2017 8/28/2018   Total Score 9 0     JOSÉ MIGUEL-7 SCORE 8/9/2017   Total Score 5           Passed - PHQ-9 score less than 5 in past 6 months    Please review last PHQ-9 score.          Passed - Patient is age 18 or older       Passed - No active pregnancy on record       Passed - No positive pregnancy test in last 12 months       Passed - Recent (6 mo) or future (30 days) visit within the authorizing provider's specialty    Patient had office visit in the last 6 months or has a visit in the next 30 days with authorizing provider or within the authorizing provider's specialty.  See \"Patient Info\" tab in inbasket, or \"Choose Columns\" in Meds & Orders section of the refill encounter.              "

## 2018-09-07 RX ORDER — ESCITALOPRAM OXALATE 10 MG/1
TABLET ORAL
Qty: 90 TABLET | Refills: 1 | Status: SHIPPED | OUTPATIENT
Start: 2018-09-07 | End: 2019-03-05

## 2018-09-07 NOTE — TELEPHONE ENCOUNTER
Depression. Prescription approved per [facility] Refill Protocol.  Ines Holloway RN

## 2018-10-11 DIAGNOSIS — Z30.41 ENCOUNTER FOR SURVEILLANCE OF CONTRACEPTIVE PILLS: ICD-10-CM

## 2018-10-11 RX ORDER — DROSPIRENONE AND ETHINYL ESTRADIOL TABLETS 0.02-3(28)
KIT ORAL
Qty: 28 TABLET | Refills: 0 | OUTPATIENT
Start: 2018-10-11

## 2018-10-11 NOTE — TELEPHONE ENCOUNTER
"Requested Prescriptions   Pending Prescriptions Disp Refills     LORYNA 3-0.02 MG per tablet [Pharmacy Med Name: LORYNA 3MG/0.02MG TABLETS 28'S] 28 tablet 0    Last Written Prescription Date:  8/28/18  Last Fill Quantity: 28,  # refills: 11   Last office visit: 8/28/2018 with prescribing provider:  Marco   Future Office Visit:     Sig: TAKE 1 TABLET BY MOUTH DAILY.    Contraceptives Protocol Failed    10/11/2018  3:53 AM       Failed - Recent (12 mo) or future (30 days) visit within the authorizing provider's specialty    Patient had office visit in the last 12 months or has a visit in the next 30 days with authorizing provider or within the authorizing provider's specialty.  See \"Patient Info\" tab in inbasket, or \"Choose Columns\" in Meds & Orders section of the refill encounter.           Passed - Patient is not a current smoker if age is 35 or older       Passed - No active pregnancy on record       Passed - No positive pregnancy test in past 12 months          "

## 2018-10-11 NOTE — TELEPHONE ENCOUNTER
Duplicate from the same pharmacy and we received a confirmation receipt on 8/12. Sent a message asking if they received it.  Ines Holloway RN

## 2019-03-05 ENCOUNTER — TELEPHONE (OUTPATIENT)
Dept: FAMILY MEDICINE | Facility: CLINIC | Age: 29
End: 2019-03-05

## 2019-03-05 DIAGNOSIS — F33.1 MODERATE EPISODE OF RECURRENT MAJOR DEPRESSIVE DISORDER (H): ICD-10-CM

## 2019-03-05 DIAGNOSIS — F41.1 GAD (GENERALIZED ANXIETY DISORDER): ICD-10-CM

## 2019-03-05 NOTE — LETTER
[facility]  [address] [place] [address]  Phone: [phone]        March 14, 2019      Tiffanie ZIMMERMAN                                                                                                           [address] [place] [address]          Issa Moreno,        We have attempted to reach you regarding your refill request, but have been unsuccessful.    We have received a refill request for one of your medications. We have sent a refill of your medication to your pharmacy, however your provider has noted that you will need to be seen for a follow up visit in order to continue this medication.    Please contact us at [phone] to schedule an appointment with your provider before your next refill is due.      Thank you,      Your Health Care Team at the [facility]

## 2019-03-06 NOTE — TELEPHONE ENCOUNTER
"Requested Prescriptions   Pending Prescriptions Disp Refills     escitalopram (LEXAPRO) 10 MG tablet [Pharmacy Med Name: ESCITALOPRAM 10MG TABLETS]  Last Written Prescription Date:  9/7/2018  Last Fill Quantity: 90 tabs,  # refills: 1   Last office visit: 8/28/2018 with prescribing provider:  Marco   Future Office Visit:     90 tablet 0     Sig: TAKE 1 TABLET(10 MG) BY MOUTH DAILY    SSRIs Protocol Failed - 3/5/2019  8:47 PM   PHQ-9 SCORE 8/9/2017 8/28/2018   PHQ-9 Total Score 9 0     JOSÉ MIGUEL-7 SCORE 8/9/2017   Total Score 5           Failed - PHQ-9 score less than 5 in past 6 months    Please review last PHQ-9 score.          Failed - Recent (6 mo) or future (30 days) visit within the authorizing provider's specialty    Patient had office visit in the last 6 months or has a visit in the next 30 days with authorizing provider or within the authorizing provider's specialty.  See \"Patient Info\" tab in inbasket, or \"Choose Columns\" in Meds & Orders section of the refill encounter.           Passed - Medication is active on med list       Passed - Patient is age 18 or older       Passed - No active pregnancy on record       Passed - No positive pregnancy test in last 12 months          "

## 2019-03-07 RX ORDER — ESCITALOPRAM OXALATE 10 MG/1
TABLET ORAL
Qty: 30 TABLET | Refills: 0 | Status: SHIPPED | OUTPATIENT
Start: 2019-03-07 | End: 2019-04-11

## 2019-03-07 NOTE — TELEPHONE ENCOUNTER
LMOM for patient to call back main clinic number to schedule an appointment with Marj Galvan DO to follow up on depression/anxiety.    Thank you,  Alannah HAYNES    NE Team [place]

## 2019-03-11 ENCOUNTER — MYC REFILL (OUTPATIENT)
Dept: FAMILY MEDICINE | Facility: CLINIC | Age: 29
End: 2019-03-11

## 2019-03-11 DIAGNOSIS — F33.1 MODERATE EPISODE OF RECURRENT MAJOR DEPRESSIVE DISORDER (H): ICD-10-CM

## 2019-03-11 DIAGNOSIS — F41.1 GAD (GENERALIZED ANXIETY DISORDER): ICD-10-CM

## 2019-03-11 NOTE — TELEPHONE ENCOUNTER
"Requested Prescriptions   Pending Prescriptions Disp Refills     escitalopram (LEXAPRO) 10 MG tablet  Last Written Prescription Date:  3/7/2019  Last Fill Quantity: 30 tabs,  # refills: 0   Last office visit: 8/28/2018 with prescribing provider:  Marco   Future Office Visit:     30 tablet 0     Sig: Take 1 tablet (10 mg) by mouth daily    SSRIs Protocol Failed - 3/11/2019  4:13 PM   PHQ-9 SCORE 8/9/2017 8/28/2018   PHQ-9 Total Score 9 0     JOSÉ MIGUEL-7 SCORE 8/9/2017   Total Score 5           Failed - PHQ-9 score less than 5 in past 6 months    Please review last PHQ-9 score.          Failed - Recent (6 mo) or future (30 days) visit within the authorizing provider's specialty    Patient had office visit in the last 6 months or has a visit in the next 30 days with authorizing provider or within the authorizing provider's specialty.  See \"Patient Info\" tab in inbasket, or \"Choose Columns\" in Meds & Orders section of the refill encounter.           Passed - Medication is active on med list       Passed - Patient is age 18 or older       Passed - No active pregnancy on record       Passed - No positive pregnancy test in last 12 months          "

## 2019-03-12 RX ORDER — ESCITALOPRAM OXALATE 10 MG/1
10 TABLET ORAL DAILY
Qty: 30 TABLET | Refills: 0 | Status: CANCELLED | OUTPATIENT
Start: 2019-03-12

## 2019-03-13 NOTE — TELEPHONE ENCOUNTER
Routing to RN, please unsign the order for the medication and close encounter.    Called patient and let her know that we cannot refill her medication unless she is seen.  Advised her that she might be able to get Catalino Lara at the [facility] to refill because she saw them back in 2018 for a physical.    Kelly Silvestre

## 2019-03-14 NOTE — TELEPHONE ENCOUNTER
Called patient and left a VM message to schedule an appointment with Dr Galvan for depression/anxiety check.    Kelly Silvestre

## 2019-04-03 DIAGNOSIS — F33.1 MODERATE EPISODE OF RECURRENT MAJOR DEPRESSIVE DISORDER (H): ICD-10-CM

## 2019-04-03 DIAGNOSIS — F41.1 GAD (GENERALIZED ANXIETY DISORDER): ICD-10-CM

## 2019-04-03 NOTE — TELEPHONE ENCOUNTER
"Requested Prescriptions   Pending Prescriptions Disp Refills     escitalopram (LEXAPRO) 10 MG tablet [Pharmacy Med Name: ESCITALOPRAM 10MG TABLETS]  Last Written Prescription Date:  3/7/19  Last Fill Quantity: 30,  # refills: 0   Last office visit: 8/28/2018 with prescribing provider:  Marco   Future Office Visit:     30 tablet 0     Sig: TAKE 1 TABLET BY MOUTH DAILY.    SSRIs Protocol Failed - 4/3/2019 11:10 AM       Failed - PHQ-9 score less than 5 in past 6 months    Please review last PHQ-9 score.          Failed - Recent (6 mo) or future (30 days) visit within the authorizing provider's specialty    Patient had office visit in the last 6 months or has a visit in the next 30 days with authorizing provider or within the authorizing provider's specialty.  See \"Patient Info\" tab in inbasket, or \"Choose Columns\" in Meds & Orders section of the refill encounter.           Passed - Medication is active on med list       Passed - Patient is age 18 or older       Passed - No active pregnancy on record       Passed - No positive pregnancy test in last 12 months          "

## 2019-04-04 RX ORDER — ESCITALOPRAM OXALATE 10 MG/1
TABLET ORAL
Qty: 30 TABLET | Refills: 0 | OUTPATIENT
Start: 2019-04-04

## 2019-04-10 NOTE — TELEPHONE ENCOUNTER
4.10.19 - Patient called in to request the refill for LEXAPRO be filled since she has an appointment scheduled in June. Please call back to advise.

## 2019-04-11 ENCOUNTER — MYC REFILL (OUTPATIENT)
Dept: FAMILY MEDICINE | Facility: CLINIC | Age: 29
End: 2019-04-11

## 2019-04-11 ENCOUNTER — TELEPHONE (OUTPATIENT)
Dept: FAMILY MEDICINE | Facility: CLINIC | Age: 29
End: 2019-04-11

## 2019-04-11 DIAGNOSIS — F41.1 GAD (GENERALIZED ANXIETY DISORDER): ICD-10-CM

## 2019-04-11 DIAGNOSIS — F33.1 MODERATE EPISODE OF RECURRENT MAJOR DEPRESSIVE DISORDER (H): ICD-10-CM

## 2019-04-11 RX ORDER — ESCITALOPRAM OXALATE 10 MG/1
10 TABLET ORAL DAILY
Qty: 90 TABLET | Refills: 0 | Status: CANCELLED | OUTPATIENT
Start: 2019-04-11

## 2019-04-11 RX ORDER — ESCITALOPRAM OXALATE 10 MG/1
10 TABLET ORAL DAILY
Qty: 90 TABLET | Refills: 0 | Status: SHIPPED | OUTPATIENT
Start: 2019-04-11 | End: 2019-07-11

## 2019-04-11 NOTE — TELEPHONE ENCOUNTER
I have not seen this patient since 2017 ,  please have her follow up with any provider  Marj Galvan D.O.

## 2019-04-11 NOTE — TELEPHONE ENCOUNTER
Route to provider. Are you okay with refilling until June. Patient hasn't been seen for mood since August.     Manuel Carvalho RN

## 2019-04-11 NOTE — TELEPHONE ENCOUNTER
"Requested Prescriptions   Pending Prescriptions Disp Refills     escitalopram (LEXAPRO) 10 MG tablet  Last Written Prescription Date:  4/11/2019  Last Fill Quantity: 90 tablet,  # refills: 0   Last office visit: 8/9/2017 with prescribing provider:  PEREZ Galvan   Prescribing Provider's NPI: [id number] Andre Soto MD    Future Office Visit:     90 tablet 0     Sig: Take 1 tablet (10 mg) by mouth daily       SSRIs Protocol Failed - 4/11/2019  1:48 PM        Failed - PHQ-9 score less than 5 in past 6 months     Please review last PHQ-9 score.   PHQ-9 SCORE 8/9/2017 8/28/2018   PHQ-9 Total Score 9 0     JOSÉ MIGUEL-7 SCORE 8/9/2017   Total Score 5             Failed - Recent (6 mo) or future (30 days) visit within the authorizing provider's specialty     Patient had office visit in the last 6 months or has a visit in the next 30 days with authorizing provider or within the authorizing provider's specialty.  See \"Patient Info\" tab in inbasket, or \"Choose Columns\" in Meds & Orders section of the refill encounter.            Passed - Medication is active on med list        Passed - Patient is age 18 or older        Passed - No active pregnancy on record        Passed - No positive pregnancy test in last 12 months          "

## 2019-04-12 NOTE — TELEPHONE ENCOUNTER
Per chart review.  Dr. Soto refilled medication on 4/11/19, and patient has physical with a new provider in June.  Referlyhart sent notifying patient to follow-up with new provider regarding medications.      María Elena Garcia RN

## 2019-07-03 ENCOUNTER — OFFICE VISIT (OUTPATIENT)
Dept: OBGYN | Facility: CLINIC | Age: 29
End: 2019-07-03
Payer: COMMERCIAL

## 2019-07-03 ENCOUNTER — RESULT FOLLOW UP (OUTPATIENT)
Dept: OBGYN | Facility: CLINIC | Age: 29
End: 2019-07-03

## 2019-07-03 VITALS
HEART RATE: 61 BPM | HEIGHT: 70 IN | SYSTOLIC BLOOD PRESSURE: 116 MMHG | WEIGHT: 162.6 LBS | DIASTOLIC BLOOD PRESSURE: 73 MMHG | BODY MASS INDEX: 23.28 KG/M2 | OXYGEN SATURATION: 97 %

## 2019-07-03 DIAGNOSIS — Z30.41 ENCOUNTER FOR SURVEILLANCE OF CONTRACEPTIVE PILLS: ICD-10-CM

## 2019-07-03 DIAGNOSIS — Z01.419 ENCOUNTER FOR GYNECOLOGICAL EXAMINATION WITHOUT ABNORMAL FINDING: Primary | ICD-10-CM

## 2019-07-03 PROCEDURE — G0145 SCR C/V CYTO,THINLAYER,RESCR: HCPCS | Performed by: OBSTETRICS & GYNECOLOGY

## 2019-07-03 PROCEDURE — 99385 PREV VISIT NEW AGE 18-39: CPT | Performed by: OBSTETRICS & GYNECOLOGY

## 2019-07-03 PROCEDURE — 87624 HPV HI-RISK TYP POOLED RSLT: CPT | Performed by: OBSTETRICS & GYNECOLOGY

## 2019-07-03 PROCEDURE — G0124 SCREEN C/V THIN LAYER BY MD: HCPCS | Performed by: OBSTETRICS & GYNECOLOGY

## 2019-07-03 RX ORDER — DROSPIRENONE AND ETHINYL ESTRADIOL 0.02-3(28)
1 KIT ORAL DAILY
Qty: 112 TABLET | Refills: 4 | Status: SHIPPED | OUTPATIENT
Start: 2019-07-03 | End: 2020-09-11

## 2019-07-03 ASSESSMENT — MIFFLIN-ST. JEOR: SCORE: 1528.83

## 2019-07-03 NOTE — LETTER
December 20, 2019      Tiffanie ZIMMERMAN  [address] [place] [address]    Dear MsRohanELSIE,      At [facility], your health and wellness is our primary concern. That is why we are following up on an abnormal pap from 7/3/19, which was reported as ASCUS and positive for high risk HPV. Your provider had recommended that you have a Colposcopy  completed by 10/3/19. Our records do not show that this has been done or scheduled.      It is important to complete the follow up that your provider has suggested for you to ensure that there are no worsening changes which may, over time, develop into cancer.      If you have chosen not to do the recommended colposcopy, please contact our office at [phone] and ask for the [facility] to schedule an appointment for a repeat PAP smear and HPV test at your earliest convenience.    If you have completed the tests outside of [facility], please have the results forwarded to our office. We will update the chart for your primary Physician to review before your next annual physical.     Thank you for choosing [facility]!    Sincerely,      Your [facility] Care Team/vickie

## 2019-07-03 NOTE — LETTER
September 5, 2019      Tiffanie ZIMMERMAN  [address] [place] [address]    Dear MsRohanELSIE,      At [facility], your health and wellness is our primary concern. That is why we are following up on an abnormal pap from 7/3/19, which was reported as ASCUS. Your provider had recommended that you have a Colposcopy  completed by 10/3/19. Our records do not show that this has been scheduled.    It is important to complete the follow up that your provider has suggested for you to ensure that there are no worsening changes which may, over time, develop into cancer.      Please contact our office at  [phone] and asks for the [facility] to schedule an appointment for a Colposcopy (this cannot be scheduled through [facility]) at your earliest convenience. If you have questions or concerns, please call the clinic and we will be happy to assist you.    If you have completed the tests outside of [facility], please have the results forwarded to our office. We will update the chart for your primary Physician to review before your next annual physical.     Thank you for choosing [facility]!    Sincerely,      Your [facility] Care Team/vickie

## 2019-07-03 NOTE — LETTER
September 12, 2019      Tiffanie ZIMMERMAN  [address] [place] [address]    Dear MsRohanELSIE,      At [facility], your health and wellness is our primary concern. That is why we are following up on an abnormal pap from 7/3/19, which was reported as ASCUS. Your provider had recommended that you have a Colposcopy  completed by 10/3/19. Our records do not show that this has been scheduled.    It is important to complete the follow up that your provider has suggested for you to ensure that there are no worsening changes which may, over time, develop into cancer.      Please contact our office at  [phone] and asks for the [facility] to schedule an appointment for a Colposcopy (this cannot be scheduled through [facility]) at your earliest convenience. If you have questions or concerns, please call the clinic and we will be happy to assist you.    If you have completed the tests outside of [facility], please have the results forwarded to our office. We will update the chart for your primary Physician to review before your next annual physical.     Thank you for choosing [facility]!    Sincerely,      Your [facility] Care Team/vickie

## 2019-07-03 NOTE — PROGRESS NOTES
Tiffanie ZIMMERMAN is a 29 year old female , who presents for annual exam.   No unusual bleeding, no incontinence, or unusual discharge.   She is currently using the OCPs for contraception and menstrual regulation.  She does not have any apparent contraindications to use.  She has not had any apparent complications with it's use.  She has tried to take them in a continuous fashion, but she will occasionally have BTB.      Past Medical History:   Diagnosis Date     ASCUS with positive high risk HPV cervical 12/15/2006    in Care Everywhere     H/O colposcopy with cervical biopsy 2007    GRETA 1       Past Surgical History:   Procedure Laterality Date     C/SECTION, LOW TRANSVERSE         OB History    Para Term  AB Living   2 0 0 0 0 2   SAB TAB Ectopic Multiple Live Births   0 0 0 0 2      # Outcome Date GA Lbr Adonay/2nd Weight Sex Delivery Anes PTL Lv   2  11   3.629 kg (8 lb) F  EPI  CHRISTIAN   1  08   3.175 kg (7 lb) M CS-LTranv Spinal  CHRISTIAN       Gyn History:  Gynecological History         Patient's last menstrual period was 2019.     STD HPV/no PID/no IUD      history of abnormal pap smear:  Yes, in teen years  Last pap:   Lab Results   Component Value Date    PAP NIL 2017           Current Outpatient Medications   Medication Sig Dispense Refill     drospirenone-ethinyl estradiol (LORYNA) 3-0.02 MG tablet Take 1 tablet by mouth daily 84 tablet 1     escitalopram (LEXAPRO) 10 MG tablet Take 1 tablet (10 mg) by mouth daily 90 tablet 0       No Known Allergies    Social History     Socioeconomic History     Marital status: Single     Spouse name: Not on file     Number of children: 2     Years of education: 16     Highest education level: Not on file   Occupational History     Not on file   Social Needs     Financial resource strain: Not on file     Food insecurity:     Worry: Not on file     Inability: Not on file     Transportation needs:     Medical:  "Not on file     Non-medical: Not on file   Tobacco Use     Smoking status: Never Smoker     Smokeless tobacco: Never Used   Substance and Sexual Activity     Alcohol use: Yes     Comment: 1 glass of wine daily     Drug use: No     Sexual activity: Yes     Partners: Male     Birth control/protection: Pill   Lifestyle     Physical activity:     Days per week: Not on file     Minutes per session: Not on file     Stress: Not on file   Relationships     Social connections:     Talks on phone: Not on file     Gets together: Not on file     Attends Judaism service: Not on file     Active member of club or organization: Not on file     Attends meetings of clubs or organizations: Not on file     Relationship status: Not on file     Intimate partner violence:     Fear of current or ex partner: Not on file     Emotionally abused: Not on file     Physically abused: Not on file     Forced sexual activity: Not on file   Other Topics Concern     Parent/sibling w/ CABG, MI or angioplasty before 65F 55M? Not Asked   Social History Narrative     Not on file       Family History   Problem Relation Age of Onset     Alcoholism Mother      Depression Mother      No Known Problems Father      Depression Maternal Grandmother      No Known Problems Brother      No Known Problems Brother          ROS:  All negative except as above.      EXAM:  /73 (BP Location: Right arm, Cuff Size: Adult Regular)   Pulse 61   Ht 1.772 m (5' 9.75\")   Wt 73.8 kg (162 lb 9.6 oz)   LMP 07/01/2019   SpO2 97%   BMI 23.50 kg/m    General:  WNWD female, NAD  Alert  Oriented x 3  Gait:  Normal  Skin:  Normal skin turgor  Neurologic:  CN grossly intact, good sensation.    HEENT:  NC/AT, EOMI  Neck:  No masses palpated, symmetrical, carotids +2/4, no bruits heard  Heart:  RRR  Lungs:  Clear   Breasts:  Symmetrical, no dimpling noted, no masses palpated, no discharge expressed  Abdomen:  Non-tender, non-distended.  Vulva: No external lesions, normal hair " distribution, no adenopathy  BUS:  Normal, no masses noted  Urethra:  No hypermobility noted  Urethral meatus:  No masses noted  Vagina: Moist, pink, no abnormal discharge, well rugated, no lesions  Cervix: Smooth, pink, no visible lesions  Uterus: Normal size, anteverted, non-tender, mobile  Ovaries: No mass, non-tender, mobile  Perianal:  No masses noted.    Extremities:  No clubbing, cyanosis, or edema      ASSESSMENT/PLAN   Annual examination with pap smear  Prescription for OCPs   Follow up with PCP regarding Lexapro.   Low fat diet, weight bearing exercises and self breast exams on a monthly basis have been recommended.  I have discussed with patient the risks, benefits, medications, treatment options and modalities.   I have instructed the patient to call or schedule a follow-up appointment if any problems.

## 2019-07-03 NOTE — LETTER
December 30, 2019      Tiffanie ZIMMERMAN  [address] [place] [address]    Dear MsRohanELSIE,      At [facility], your health and wellness is our primary concern. That is why we are following up on an abnormal pap from 7/3/19, which was reported as ASCUS and positive for high risk HPV. Your provider had recommended that you have a Colposcopy  completed by 10/3/19. Our records do not show that this has been done or scheduled.      It is important to complete the follow up that your provider has suggested for you to ensure that there are no worsening changes which may, over time, develop into cancer.      If you have chosen not to do the recommended colposcopy, please contact our office at [phone] and ask for the [facility] to schedule an appointment for a repeat PAP smear and HPV test at your earliest convenience.    If you have completed the tests outside of [facility], please have the results forwarded to our office. We will update the chart for your primary Physician to review before your next annual physical.     Thank you for choosing [facility]!    Sincerely,      Your [facility] Care Team/vickie

## 2019-07-09 LAB
COPATH REPORT: ABNORMAL
PAP: ABNORMAL

## 2019-07-10 DIAGNOSIS — F33.1 MODERATE EPISODE OF RECURRENT MAJOR DEPRESSIVE DISORDER (H): ICD-10-CM

## 2019-07-10 DIAGNOSIS — F41.1 GAD (GENERALIZED ANXIETY DISORDER): ICD-10-CM

## 2019-07-10 LAB
FINAL DIAGNOSIS: ABNORMAL
HPV HR 12 DNA CVX QL NAA+PROBE: POSITIVE
HPV16 DNA SPEC QL NAA+PROBE: NEGATIVE
HPV18 DNA SPEC QL NAA+PROBE: NEGATIVE
SPECIMEN DESCRIPTION: ABNORMAL
SPECIMEN SOURCE CVX/VAG CYTO: ABNORMAL

## 2019-07-10 NOTE — TELEPHONE ENCOUNTER
"Requested Prescriptions   Pending Prescriptions Disp Refills     escitalopram (LEXAPRO) 10 MG tablet [Pharmacy Med Name: ESCITALOPRAM 10MG TABLETS]  Last Written Prescription Date:  4/11/2019  Last Fill Quantity: 90 tabs,  # refills: 0   Last office visit: 8/28/2018 with prescribing provider:  Charles   Future Office Visit:     90 tablet 0     Sig: TAKE 1 TABLET(10 MG) BY MOUTH DAILY       SSRIs Protocol Failed - 7/10/2019  7:29 AM        Failed - PHQ-9 score less than 5 in past 6 months     Please review last PHQ-9 score.           Failed - Recent (6 mo) or future (30 days) visit within the authorizing provider's specialty     Patient had office visit in the last 6 months or has a visit in the next 30 days with authorizing provider or within the authorizing provider's specialty.  See \"Patient Info\" tab in inbasket, or \"Choose Columns\" in Meds & Orders section of the refill encounter.            Passed - Medication is active on med list        Passed - Patient is age 18 or older        Passed - No active pregnancy on record        Passed - No positive pregnancy test in last 12 months          "

## 2019-07-10 NOTE — PROGRESS NOTES
12/15/06 ASCUS pap, + HR HPV (not 16 or 18). in Care Everywhere  1/29/07 [place]: GRETA 1  1/21/08 NIL pap  12/10/08 NIL pap  5/12/15 NIL pap  5/27/16 NIL pap  8/9/17 NIL pap Plan: [facility] in 3 years, as patient will be > 29 yo.  7/3/19 ASCUS pap with + HR HPV (not 16 or 18). Plan colp. (MRA)  7/11/19 Message left to return call. (johnathan) Pt notified and not sure if she will have the colp done. (johnathan)  9/5/19 My Chart Colposcopy Reminder message sent (rlm)  9/12/19 My Chart not read, reminder letter sent (rlm)  10/10/19 3mo [place] not done, updated to 6mo [place]/Pap due by 1/3/20 (rlm)  12/20/19 My Chart [place]/Pap Reminder message sent (rlm)  12/30/19 My Chart not read, reminder letter sent (rlm)  2/7/20 Appointment cancelled because pt cant get time off work, pushing tracker out (rlm)  04/07/20 Due to COVID restrictions - Routed to RN to review recommendations . (es)  4/13/20 Per RN pt is lost to f/u, Patient is lost to follow-up. Routed to provider as FYI. (rlm)

## 2019-07-11 ENCOUNTER — TELEPHONE (OUTPATIENT)
Dept: OBGYN | Facility: CLINIC | Age: 29
End: 2019-07-11

## 2019-07-11 DIAGNOSIS — F41.1 GAD (GENERALIZED ANXIETY DISORDER): ICD-10-CM

## 2019-07-11 DIAGNOSIS — F33.1 MODERATE EPISODE OF RECURRENT MAJOR DEPRESSIVE DISORDER (H): ICD-10-CM

## 2019-07-11 RX ORDER — ESCITALOPRAM OXALATE 10 MG/1
10 TABLET ORAL DAILY
Qty: 90 TABLET | Refills: 0 | Status: SHIPPED | OUTPATIENT
Start: 2019-07-11 | End: 2019-10-31 | Stop reason: ALTCHOICE

## 2019-07-11 NOTE — TELEPHONE ENCOUNTER
Pt informed that Dr. Salomon filled Lexapro for three months but pt needs to follow up with FP. Pt stated she has called to schedule.    Sharon Rollins RN on 7/11/2019 at 1:37 PM

## 2019-07-11 NOTE — TELEPHONE ENCOUNTER
Pt did see Dr. Salomon on 7/3/19 for a yearly physical.  Per OV plan:  Follow up with PCP regarding Lexapro.  I spoke with pt and let her know that Dr. Salomon wants her to follow up with her PCP or a family practice provider regarding her Lexapro.  Pt was upset and states she wasn't aware that this is what he was suggesting.  She thought that Dr. Salomon was going to give her a refill until she was able to see another provider and now she is out.  Pt states she can not go without her Lexapro.  Pt does not have a PCP.    I will route to Dr. Salomon to see if he is willing to give pt a bridge rx for Lexapro until she is able to see another provider to discuss continuing Lexapro.    Jemma Watters RN

## 2019-07-11 NOTE — TELEPHONE ENCOUNTER
Patient calling is completely out of her Lexapro, states had a physical last week and requested a refill be sent to pharmacy. Pharmacy did not received this. Please call to advise

## 2019-07-12 RX ORDER — ESCITALOPRAM OXALATE 10 MG/1
10 TABLET ORAL DAILY
Qty: 14 TABLET | Refills: 0 | Status: SHIPPED | OUTPATIENT
Start: 2019-07-12 | End: 2019-08-09 | Stop reason: ALTCHOICE

## 2019-07-12 NOTE — TELEPHONE ENCOUNTER
14 tablet francisca period sent. Patient has canceled her follow up appointments. Has an appointment scheduled on 7/26 with Dr. Lara.  Holden Dukes RN

## 2019-08-09 ENCOUNTER — OFFICE VISIT (OUTPATIENT)
Dept: FAMILY MEDICINE | Facility: CLINIC | Age: 29
End: 2019-08-09
Payer: COMMERCIAL

## 2019-08-09 VITALS
OXYGEN SATURATION: 99 % | BODY MASS INDEX: 22.71 KG/M2 | DIASTOLIC BLOOD PRESSURE: 60 MMHG | WEIGHT: 158.6 LBS | HEIGHT: 70 IN | TEMPERATURE: 98.1 F | SYSTOLIC BLOOD PRESSURE: 110 MMHG | RESPIRATION RATE: 16 BRPM | HEART RATE: 79 BPM

## 2019-08-09 DIAGNOSIS — F41.1 GAD (GENERALIZED ANXIETY DISORDER): ICD-10-CM

## 2019-08-09 DIAGNOSIS — F33.1 MODERATE EPISODE OF RECURRENT MAJOR DEPRESSIVE DISORDER (H): ICD-10-CM

## 2019-08-09 PROCEDURE — 99214 OFFICE O/P EST MOD 30 MIN: CPT | Performed by: PHYSICIAN ASSISTANT

## 2019-08-09 RX ORDER — ESCITALOPRAM OXALATE 10 MG/1
10 TABLET ORAL EVERY 12 HOURS
Qty: 60 TABLET | Refills: 0 | Status: SHIPPED | OUTPATIENT
Start: 2019-08-09 | End: 2019-09-14

## 2019-08-09 ASSESSMENT — ANXIETY QUESTIONNAIRES
3. WORRYING TOO MUCH ABOUT DIFFERENT THINGS: NOT AT ALL
5. BEING SO RESTLESS THAT IT IS HARD TO SIT STILL: SEVERAL DAYS
6. BECOMING EASILY ANNOYED OR IRRITABLE: SEVERAL DAYS
IF YOU CHECKED OFF ANY PROBLEMS ON THIS QUESTIONNAIRE, HOW DIFFICULT HAVE THESE PROBLEMS MADE IT FOR YOU TO DO YOUR WORK, TAKE CARE OF THINGS AT HOME, OR GET ALONG WITH OTHER PEOPLE: SOMEWHAT DIFFICULT
7. FEELING AFRAID AS IF SOMETHING AWFUL MIGHT HAPPEN: SEVERAL DAYS
2. NOT BEING ABLE TO STOP OR CONTROL WORRYING: NOT AT ALL
1. FEELING NERVOUS, ANXIOUS, OR ON EDGE: SEVERAL DAYS
GAD7 TOTAL SCORE: 5

## 2019-08-09 ASSESSMENT — MIFFLIN-ST. JEOR: SCORE: 1510.68

## 2019-08-09 ASSESSMENT — PATIENT HEALTH QUESTIONNAIRE - PHQ9
5. POOR APPETITE OR OVEREATING: SEVERAL DAYS
SUM OF ALL RESPONSES TO PHQ QUESTIONS 1-9: 2

## 2019-08-09 NOTE — PROGRESS NOTES
"Subjective     Tiffanie ZIMMERMAN is a 29 year old female who presents to clinic today for the following health issues:    HPI   Depression and Anxiety Follow-Up    How are you doing with your depression since your last visit? No change    How are you doing with your anxiety since your last visit?  No change    Are you having other symptoms that might be associated with depression or anxiety? No    Have you had a significant life event? No     Do you have any concerns with your use of alcohol or other drugs? No    Social History     Tobacco Use     Smoking status: Never Smoker     Smokeless tobacco: Never Used   Substance Use Topics     Alcohol use: Yes     Comment: 1 glass of wine daily     Drug use: No     PHQ 8/9/2017 8/28/2018   PHQ-9 Total Score 9 0   Q9: Thoughts of better off dead/self-harm past 2 weeks Not at all Not at all     JOSÉ MIGUEL-7 SCORE 8/9/2017   Total Score 5     PHQ-9 and JOSÉ MIGUEL-7 obtained and reviewed.    Suicide Assessment Five-step Evaluation and Treatment (SAFE-T)      How many servings of fruits and vegetables do you eat daily?  4 or more    On average, how many sweetened beverages do you drink each day (soda, juice, sweet tea, etc)?   0    How many days per week do you miss taking your medication? 0    Patient notes that she can feel the medication wearing off by the afternoon.  Discussed daily wine use and Patient realizes she may be using it as an anxiolytic.  She will monitor and follow up.       Review of Systems   ROS COMP: Constitutional, HEENT, cardiovascular, pulmonary, gi and gu systems are negative, except as otherwise noted.      Objective    /60   Pulse 79   Temp 98.1  F (36.7  C) (Oral)   Resp 16   Ht 1.772 m (5' 9.75\")   Wt 71.9 kg (158 lb 9.6 oz)   SpO2 99%   BMI 22.92 kg/m    Body mass index is 22.92 kg/m .  Physical Exam   GENERAL: healthy, alert and no distress  MS: no gross musculoskeletal defects noted, no edema  SKIN: no suspicious lesions or rashes  PSYCH: .Psych: " Alert and oriented times 3; coherent speech, normal   rate and volume, able to articulate logical thoughts, able   to abstract reason, no tangential thoughts, no hallucinations   or delusions  Her affect is congruent.     DAP drafted and reviewed.     Diagnostic Test Results:  none         Assessment & Plan     1. Moderate episode of recurrent major depressive disorder (H)  - escitalopram (LEXAPRO) 10 MG tablet; Take 1 tablet (10 mg) by mouth every 12 hours Appointment scheduled for 7/26 for further refills.  Dispense: 60 tablet; Refill: 0    2. JOSÉ MIGUEL (generalized anxiety disorder)  - escitalopram (LEXAPRO) 10 MG tablet; Take 1 tablet (10 mg) by mouth every 12 hours Appointment scheduled for 7/26 for further refills.  Dispense: 60 tablet; Refill: 0    Use medication as directed.  Follow up in 1 month, sooner if needed.  Patient amenable to this follow up plan.          No follow-ups on file.    Dmitry Strong PA-C  [facility]

## 2019-08-09 NOTE — LETTER
My Depression Action Plan  Name: Tiffanie ZIMMERMAN   Date of Birth 1990  Date: 8/9/2019    My doctor: No Ref-Primary, Physician   My clinic: [address]  [place] [address]  [phone]          GREEN    ZONE   Good Control    What it looks like:     Things are going generally well. You have normal up s and down s. You may even feel depressed from time to time, but bad moods usually last less than a day.   What you need to do:  1. Continue to care for yourself (see self care plan)  2. Check your depression survival kit and update it as needed  3. Follow your physician s recommendations including any medication.  4. Do not stop taking medication unless you consult with your physician first.           YELLOW         ZONE Getting Worse    What it looks like:     Depression is starting to interfere with your life.     It may be hard to get out of bed; you may be starting to isolate yourself from others.    Symptoms of depression are starting to last most all day and this has happened for several days.     You may have suicidal thoughts but they are not constant.   What you need to do:     1. Call your care team, your response to treatment will improve if you keep your care team informed of your progress. Yellow periods are signs an adjustment may need to be made.     2. Continue your self-care, even if you have to fake it!    3. Talk to someone in your support network    4. Open up your depression survival kit           RED    ZONE Medical Alert - Get Help    What it looks like:     Depression is seriously interfering with your life.     You may experience these or other symptoms: You can t get out of bed most days, can t work or engage in other necessary activities, you have trouble taking care of basic hygiene, or basic responsibilities, thoughts of suicide or death that will not go away, self-injurious behavior.     What you need to do:  1. Call your care team  and request a same-day appointment. If they are not available (weekends or after hours) call your local crisis line, emergency room or 911.            Depression Self Care Plan / Survival Kit    Self-Care for Depression  Here s the deal. Your body and mind are really not as separate as most people think.  What you do and think affects how you feel and how you feel influences what you do and think. This means if you do things that people who feel good do, it will help you feel better.  Sometimes this is all it takes.  There is also a place for medication and therapy depending on how severe your depression is, so be sure to consult with your medical provider and/ or Behavioral Health Consultant if your symptoms are worsening or not improving.     In order to better manage my stress, I will:    Exercise  Get some form of exercise, every day. This will help reduce pain and release endorphins, the  feel good  chemicals in your brain. This is almost as good as taking antidepressants!  This is not the same as joining a gym and then never going! (they count on that by the way ) It can be as simple as just going for a walk or doing some gardening, anything that will get you moving.      Hygiene   Maintain good hygiene (Get out of bed in the morning, Make your bed, Brush your teeth, Take a shower, and Get dressed like you were going to work, even if you are unemployed).  If your clothes don't fit try to get ones that do.    Diet  I will strive to eat foods that are good for me, drink plenty of water, and avoid excessive sugar, caffeine, alcohol, and other mood-altering substances.  Some foods that are helpful in depression are: complex carbohydrates, B vitamins, flaxseed, fish or fish oil, fresh fruits and vegetables.    Psychotherapy  I agree to participate in Individual Therapy (if recommended).    Medication  If prescribed medications, I agree to take them.  Missing doses can result in serious side effects.  I understand  that drinking alcohol, or other illicit drug use, may cause potential side effects.  I will not stop my medication abruptly without first discussing it with my provider.    Staying Connected With Others  I will stay in touch with my friends, family members, and my primary care provider/team.    Use your imagination  Be creative.  We all have a creative side; it doesn t matter if it s oil painting, sand castles, or mud pies! This will also kick up the endorphins.    Witness Beauty  (AKA stop and smell the roses) Take a look outside, even in mid-winter. Notice colors, textures. Watch the squirrels and birds.     Service to others  Be of service to others.  There is always someone else in need.  By helping others we can  get out of ourselves  and remember the really important things.  This also provides opportunities for practicing all the other parts of the program.    Humor  Laugh and be silly!  Adjust your TV habits for less news and crime-drama and more comedy.    Control your stress  Try breathing deep, massage therapy, biofeedback, and meditation. Find time to relax each day.     My support system    Clinic Contact:  Phone number:    Contact 1:  Phone number:    Contact 2:  Phone number:    Mormon/:  Phone number:    Therapist:  Phone number:    Local crisis center:    Phone number:    Other community support:  Phone number:

## 2019-08-10 ASSESSMENT — ANXIETY QUESTIONNAIRES: GAD7 TOTAL SCORE: 5

## 2019-09-14 DIAGNOSIS — F41.1 GAD (GENERALIZED ANXIETY DISORDER): ICD-10-CM

## 2019-09-14 DIAGNOSIS — F33.1 MODERATE EPISODE OF RECURRENT MAJOR DEPRESSIVE DISORDER (H): ICD-10-CM

## 2019-09-14 NOTE — TELEPHONE ENCOUNTER
"Requested Prescriptions   Pending Prescriptions Disp Refills     escitalopram (LEXAPRO) 10 MG tablet [Pharmacy Med Name: ESCITALOPRAM 10MG TABLETS] 60 tablet 0     Sig: TAKE 1 TABLET BY MOUTH EVERY 12 HOURS         Last Written Prescription Date:  8/9/19  Last Fill Quantity: 60,  # refills: 0   Last Office Visit with [facility], P or [facility] prescribing provider:  8/9/19   Future Office Visit:         SSRIs Protocol Passed - 9/14/2019  7:58 AM        Passed - PHQ-9 score less than 5 in past 6 months     Please review last PHQ-9 score.           Passed - Medication is active on med list        Passed - Patient is age 18 or older        Passed - No active pregnancy on record        Passed - No positive pregnancy test in last 12 months        Passed - Recent (6 mo) or future (30 days) visit within the authorizing provider's specialty     Patient had office visit in the last 6 months or has a visit in the next 30 days with authorizing provider or within the authorizing provider's specialty.  See \"Patient Info\" tab in inbasket, or \"Choose Columns\" in Meds & Orders section of the refill encounter.                Bang Faarax  [facility]  "

## 2019-09-17 RX ORDER — ESCITALOPRAM OXALATE 10 MG/1
TABLET ORAL
Qty: 60 TABLET | Refills: 0 | Status: SHIPPED | OUTPATIENT
Start: 2019-09-17 | End: 2019-10-31 | Stop reason: ALTCHOICE

## 2019-09-17 NOTE — TELEPHONE ENCOUNTER
Medication is being filled for 1 time refill only due to:  Patient needs to be seen because needs recheck.   Please schedule.  Lillie Purvis RN

## 2019-10-29 NOTE — PROGRESS NOTES
"Subjective     Tiffanie ZIMMERMAN is a 29 year old female who presents to clinic today for the following health issues:    HPI   Depression and Anxiety Follow-Up    How are you doing with your depression since your last visit? Worsened     How are you doing with your anxiety since your last visit?  Worsened     Are you having other symptoms that might be associated with depression or anxiety? No    Have you had a significant life event? No     Do you have any concerns with your use of alcohol or other drugs? Yes:  wine    Social History     Tobacco Use     Smoking status: Never Smoker     Smokeless tobacco: Never Used   Substance Use Topics     Alcohol use: Yes     Comment: 1 glass of wine daily     Drug use: No     PHQ 8/9/2017 8/28/2018 8/9/2019   PHQ-9 Total Score 9 0 2   Q9: Thoughts of better off dead/self-harm past 2 weeks Not at all Not at all Not at all     JOSÉ MIGUEL-7 SCORE 8/9/2017 8/9/2019   Total Score 5 5         Suicide Assessment Five-step Evaluation and Treatment (SAFE-T)      How many servings of fruits and vegetables do you eat daily?  4 or more    On average, how many sweetened beverages do you drink each day (soda, juice, sweet tea, etc)?   0    How many days per week do you miss taking your medication? Once a month    What makes it hard for you to take your medications?  remembering to take      Review of Systems   ROS COMP: Constitutional, HEENT, cardiovascular, pulmonary, gi and gu systems are negative, except as otherwise noted.      Objective    /70   Pulse 71   Temp 98.3  F (36.8  C) (Oral)   Resp 16   Ht 1.772 m (5' 9.75\")   Wt 75.9 kg (167 lb 6.4 oz)   SpO2 99%   BMI 24.19 kg/m    Body mass index is 24.19 kg/m .  Physical Exam   GENERAL: healthy, alert and no distress  MS: no gross musculoskeletal defects noted, no edema  SKIN: no suspicious lesions or rashes  PSYCH: Psych: Alert and oriented times 3; coherent speech, normal   rate and volume, able to articulate logical thoughts, " able   to abstract reason, no tangential thoughts, no hallucinations   or delusions  Her affect is appropriate.        Assessment & Plan     1. Moderate episode of recurrent major depressive disorder (H)  - sertraline (ZOLOFT) 50 MG tablet; Take 1 tablet (50 mg) by mouth daily  Dispense: 30 tablet; Refill: 1  - MENTAL HEALTH REFERRAL  - Adult; Outpatient Treatment; Individual/Couples/Family/Group Therapy/Health Psychology; [facility] [place]: [facility] [phone]; We will contact you to schedule the appointment or please call with any questions    2. JOSÉ MIGUEL (generalized anxiety disorder)  - sertraline (ZOLOFT) 50 MG tablet; Take 1 tablet (50 mg) by mouth daily  Dispense: 30 tablet; Refill: 1  - MENTAL HEALTH REFERRAL  - Adult; Outpatient Treatment; Individual/Couples/Family/Group Therapy/Health Psychology; [facility] [place]: [facility] [phone]; We will contact you to schedule the appointment or please call with any questions     Use medication as directed.  Follow up in 2-4 weeks.    Dmitry Strong PA-C  [facility] [place]

## 2019-10-31 ENCOUNTER — OFFICE VISIT (OUTPATIENT)
Dept: FAMILY MEDICINE | Facility: CLINIC | Age: 29
End: 2019-10-31
Payer: COMMERCIAL

## 2019-10-31 VITALS
HEIGHT: 70 IN | OXYGEN SATURATION: 99 % | RESPIRATION RATE: 16 BRPM | BODY MASS INDEX: 23.96 KG/M2 | TEMPERATURE: 98.3 F | HEART RATE: 71 BPM | DIASTOLIC BLOOD PRESSURE: 70 MMHG | SYSTOLIC BLOOD PRESSURE: 110 MMHG | WEIGHT: 167.4 LBS

## 2019-10-31 DIAGNOSIS — F33.1 MODERATE EPISODE OF RECURRENT MAJOR DEPRESSIVE DISORDER (H): ICD-10-CM

## 2019-10-31 DIAGNOSIS — F41.1 GAD (GENERALIZED ANXIETY DISORDER): ICD-10-CM

## 2019-10-31 PROCEDURE — 99214 OFFICE O/P EST MOD 30 MIN: CPT | Performed by: PHYSICIAN ASSISTANT

## 2019-10-31 RX ORDER — ESCITALOPRAM OXALATE 10 MG/1
TABLET ORAL
Qty: 60 TABLET | Refills: 0 | Status: CANCELLED | OUTPATIENT
Start: 2019-10-31

## 2019-10-31 ASSESSMENT — PATIENT HEALTH QUESTIONNAIRE - PHQ9
5. POOR APPETITE OR OVEREATING: MORE THAN HALF THE DAYS
SUM OF ALL RESPONSES TO PHQ QUESTIONS 1-9: 6

## 2019-10-31 ASSESSMENT — ANXIETY QUESTIONNAIRES
6. BECOMING EASILY ANNOYED OR IRRITABLE: MORE THAN HALF THE DAYS
GAD7 TOTAL SCORE: 13
5. BEING SO RESTLESS THAT IT IS HARD TO SIT STILL: SEVERAL DAYS
IF YOU CHECKED OFF ANY PROBLEMS ON THIS QUESTIONNAIRE, HOW DIFFICULT HAVE THESE PROBLEMS MADE IT FOR YOU TO DO YOUR WORK, TAKE CARE OF THINGS AT HOME, OR GET ALONG WITH OTHER PEOPLE: SOMEWHAT DIFFICULT
2. NOT BEING ABLE TO STOP OR CONTROL WORRYING: MORE THAN HALF THE DAYS
1. FEELING NERVOUS, ANXIOUS, OR ON EDGE: MORE THAN HALF THE DAYS
3. WORRYING TOO MUCH ABOUT DIFFERENT THINGS: MORE THAN HALF THE DAYS
7. FEELING AFRAID AS IF SOMETHING AWFUL MIGHT HAPPEN: MORE THAN HALF THE DAYS

## 2019-10-31 ASSESSMENT — MIFFLIN-ST. JEOR: SCORE: 1550.6

## 2019-10-31 NOTE — LETTER
My Depression Action Plan  Name: Tiffanie ZIMMERMAN   Date of Birth 1990  Date: 10/31/2019    My doctor: No Ref-Primary, Physician   My clinic: [address]  [place] [address]  [phone]          GREEN    ZONE   Good Control    What it looks like:     Things are going generally well. You have normal up s and down s. You may even feel depressed from time to time, but bad moods usually last less than a day.   What you need to do:  1. Continue to care for yourself (see self care plan)  2. Check your depression survival kit and update it as needed  3. Follow your physician s recommendations including any medication.  4. Do not stop taking medication unless you consult with your physician first.           YELLOW         ZONE Getting Worse    What it looks like:     Depression is starting to interfere with your life.     It may be hard to get out of bed; you may be starting to isolate yourself from others.    Symptoms of depression are starting to last most all day and this has happened for several days.     You may have suicidal thoughts but they are not constant.   What you need to do:     1. Call your care team, your response to treatment will improve if you keep your care team informed of your progress. Yellow periods are signs an adjustment may need to be made.     2. Continue your self-care, even if you have to fake it!    3. Talk to someone in your support network    4. Open up your depression survival kit           RED    ZONE Medical Alert - Get Help    What it looks like:     Depression is seriously interfering with your life.     You may experience these or other symptoms: You can t get out of bed most days, can t work or engage in other necessary activities, you have trouble taking care of basic hygiene, or basic responsibilities, thoughts of suicide or death that will not go away, self-injurious behavior.     What you need to do:  1. Call your care  team and request a same-day appointment. If they are not available (weekends or after hours) call your local crisis line, emergency room or 911.            Depression Self Care Plan / Survival Kit    Self-Care for Depression  Here s the deal. Your body and mind are really not as separate as most people think.  What you do and think affects how you feel and how you feel influences what you do and think. This means if you do things that people who feel good do, it will help you feel better.  Sometimes this is all it takes.  There is also a place for medication and therapy depending on how severe your depression is, so be sure to consult with your medical provider and/ or Behavioral Health Consultant if your symptoms are worsening or not improving.     In order to better manage my stress, I will:    Exercise  Get some form of exercise, every day. This will help reduce pain and release endorphins, the  feel good  chemicals in your brain. This is almost as good as taking antidepressants!  This is not the same as joining a gym and then never going! (they count on that by the way ) It can be as simple as just going for a walk or doing some gardening, anything that will get you moving.      Hygiene   Maintain good hygiene (Get out of bed in the morning, Make your bed, Brush your teeth, Take a shower, and Get dressed like you were going to work, even if you are unemployed).  If your clothes don't fit try to get ones that do.    Diet  I will strive to eat foods that are good for me, drink plenty of water, and avoid excessive sugar, caffeine, alcohol, and other mood-altering substances.  Some foods that are helpful in depression are: complex carbohydrates, B vitamins, flaxseed, fish or fish oil, fresh fruits and vegetables.    Psychotherapy  I agree to participate in Individual Therapy (if recommended).    Medication  If prescribed medications, I agree to take them.  Missing doses can result in serious side effects.  I  understand that drinking alcohol, or other illicit drug use, may cause potential side effects.  I will not stop my medication abruptly without first discussing it with my provider.    Staying Connected With Others  I will stay in touch with my friends, family members, and my primary care provider/team.    Use your imagination  Be creative.  We all have a creative side; it doesn t matter if it s oil painting, sand castles, or mud pies! This will also kick up the endorphins.    Witness Beauty  (AKA stop and smell the roses) Take a look outside, even in mid-winter. Notice colors, textures. Watch the squirrels and birds.     Service to others  Be of service to others.  There is always someone else in need.  By helping others we can  get out of ourselves  and remember the really important things.  This also provides opportunities for practicing all the other parts of the program.    Humor  Laugh and be silly!  Adjust your TV habits for less news and crime-drama and more comedy.    Control your stress  Try breathing deep, massage therapy, biofeedback, and meditation. Find time to relax each day.     My support system    Clinic Contact:  Phone number:    Contact 1:  Phone number:    Contact 2:  Phone number:    Ep[employer]opalian/:  Phone number:    Therapist:  Phone number:    Local crisis center:    Phone number:    Other community support:  Phone number:

## 2019-10-31 NOTE — PATIENT INSTRUCTIONS
Patient Education     Anxiety Reaction  Anxiety is the feeling we all get when we think something bad might happen. It is a normal response to stress and usually causes only a mild reaction. When anxiety becomes more severe, it can interfere with daily life. In some cases, you may not even be aware of what it is you re anxious about. There may also be a genetic link or it may be a learned behavior in the home.  Both psychological and physical triggers cause stress reaction. It's often a response to fear or emotional stress, real or imagined. This stress may come from home, family, work, or social relationships.  During an anxiety reaction, you may feel:    Helpless    Nervous    Depressed    Irritable  Your body may show signs of anxiety in many ways. You may experience:    Dry mouth    Shakiness    Dizziness    Weakness    Trouble breathing    Breathing fast (hyperventilating)    Chest pressure    Sweating    Headache    Nausea    Diarrhea    Tiredness    Inability to sleep    Sexual problems  Home care    Try to locate the sources of stress in your life. They may not be obvious. These may include:  ? Daily hassles of life (such as traffic jams, missed appointments, or car troubles)  ? Major life changes, both good (new baby or job promotion) and bad (loss of job or loss of loved one)  ? Overload: feeling that you have too many responsibilities and can't take care of all of them at once  ? Feeling helpless or feeling that your problems are beyond what you re able to solve    Notice how your body reacts to stress. Learn to listen to your body signals. This will help you take action before the stress becomes severe.    When you can, do something about the source of your stress. (Avoid hassles, limit the amount of change that happens in your life at one time and take a break when you feel overloaded).    Unfortunately, many stressful situations can't be avoided. It is necessary to learn how to better manage stress.  There are many proven methods that will reduce your anxiety. These include simple things like exercise, good nutrition, and adequate rest. Also, there are certain techniques that are helpful:  ? Relaxation  ? Breathing exercises  ? Visualization  ? Biofeedback  ? Meditation  For more information about this, consult your healthcare provider or go to a local bookstore and review the many books and tapes available on this subject.  Follow-up care  If you feel that your anxiety is not responding to self-help measures, contact your healthcare provider or make an appointment with a counselor. You may need short-term psychological counseling and temporary medicine to help you manage stress.  Call 911  Call 911 if any of these happen:    Trouble breathing    Confusion    Drowsiness or trouble wakening    Fainting or loss of consciousness    Rapid heart rate    Seizure    New chest pain that becomes more severe, lasts longer, or spreads into your shoulder, arm, neck, jaw, or back  When to seek medical advice  Call your healthcare provider right away if any of these happen:    Your symptoms get worse    Severe headache not relieved by rest and mild pain reliever  Date Last Reviewed: 10/1/2017    2824-0132 [employer]. [address] [place] [address], [place] [address]. All rights reserved. This information is not intended as a substitute for professional medical care. Always follow your healthcare professional's instructions.

## 2019-11-02 ASSESSMENT — ANXIETY QUESTIONNAIRES: GAD7 TOTAL SCORE: 13

## 2019-11-04 ENCOUNTER — MYC MEDICAL ADVICE (OUTPATIENT)
Dept: FAMILY MEDICINE | Facility: CLINIC | Age: 29
End: 2019-11-04

## 2019-11-04 ENCOUNTER — HEALTH MAINTENANCE LETTER (OUTPATIENT)
Age: 29
End: 2019-11-04

## 2019-11-04 NOTE — TELEPHONE ENCOUNTER
See Qu[employer] message below. Patient was seen on 10/31/19 patient was given prescription for Zoloft 50mg.    Destiny Coleman RN

## 2019-11-05 ENCOUNTER — MYC MEDICAL ADVICE (OUTPATIENT)
Dept: FAMILY MEDICINE | Facility: CLINIC | Age: 29
End: 2019-11-05

## 2019-12-26 DIAGNOSIS — F41.1 GAD (GENERALIZED ANXIETY DISORDER): ICD-10-CM

## 2019-12-26 DIAGNOSIS — F33.1 MODERATE EPISODE OF RECURRENT MAJOR DEPRESSIVE DISORDER (H): ICD-10-CM

## 2019-12-29 NOTE — TELEPHONE ENCOUNTER
Routing refill request to provider for review/approval because:  PHQ-9 score:    PHQ-9 SCORE 10/31/2019   PHQ-9 Total Score 6

## 2020-02-21 ENCOUNTER — E-VISIT (OUTPATIENT)
Dept: FAMILY MEDICINE | Facility: CLINIC | Age: 30
End: 2020-02-21
Payer: COMMERCIAL

## 2020-02-21 DIAGNOSIS — F41.1 GENERALIZED ANXIETY DISORDER: Primary | ICD-10-CM

## 2020-02-21 PROCEDURE — 98970 NQHP OL DIG ASSMT&MGMT 5-10: CPT | Performed by: NURSE PRACTITIONER

## 2020-02-21 RX ORDER — SERTRALINE HYDROCHLORIDE 100 MG/1
100 TABLET, FILM COATED ORAL DAILY
Qty: 30 TABLET | Refills: 1 | Status: SHIPPED | OUTPATIENT
Start: 2020-02-21 | End: 2020-04-29

## 2020-02-21 ASSESSMENT — ANXIETY QUESTIONNAIRES
5. BEING SO RESTLESS THAT IT IS HARD TO SIT STILL: NOT AT ALL
GAD7 TOTAL SCORE: 10
1. FEELING NERVOUS, ANXIOUS, OR ON EDGE: SEVERAL DAYS
7. FEELING AFRAID AS IF SOMETHING AWFUL MIGHT HAPPEN: SEVERAL DAYS
2. NOT BEING ABLE TO STOP OR CONTROL WORRYING: MORE THAN HALF THE DAYS
4. TROUBLE RELAXING: NEARLY EVERY DAY
6. BECOMING EASILY ANNOYED OR IRRITABLE: SEVERAL DAYS
GAD7 TOTAL SCORE: 10
3. WORRYING TOO MUCH ABOUT DIFFERENT THINGS: MORE THAN HALF THE DAYS
7. FEELING AFRAID AS IF SOMETHING AWFUL MIGHT HAPPEN: SEVERAL DAYS

## 2020-02-21 NOTE — PATIENT INSTRUCTIONS
Thank you for choosing us for your care. I have placed an order for a prescription so that you can start treatment. View your full visit summary for details by clicking on the link below. Your pharmacist will able to address any questions you may have about the medication.      If you're not feeling better within 4-6 weeks please schedule an in-person appointment.  You can [place] right [place], or call [phone]  If the visit is for the same symptoms as your e-visit, we'll refund the cost of your e-visit if seen within seven days.

## 2020-02-22 ASSESSMENT — ANXIETY QUESTIONNAIRES: GAD7 TOTAL SCORE: 10

## 2020-04-14 NOTE — PROGRESS NOTES
Follow up attempts noted.   Pap smear tracking is closed as patient is considered lost to follow up.  Giovanni Salomon MD

## 2020-04-29 DIAGNOSIS — F41.1 GENERALIZED ANXIETY DISORDER: ICD-10-CM

## 2020-04-29 RX ORDER — SERTRALINE HYDROCHLORIDE 100 MG/1
TABLET, FILM COATED ORAL
Qty: 30 TABLET | Refills: 2 | Status: SHIPPED | OUTPATIENT
Start: 2020-04-29 | End: 2020-08-04

## 2020-08-04 DIAGNOSIS — F41.1 GENERALIZED ANXIETY DISORDER: ICD-10-CM

## 2020-08-04 RX ORDER — SERTRALINE HYDROCHLORIDE 100 MG/1
TABLET, FILM COATED ORAL
Qty: 30 TABLET | Refills: 2 | Status: SHIPPED | OUTPATIENT
Start: 2020-08-04 | End: 2020-09-30

## 2020-09-02 DIAGNOSIS — Z30.41 ENCOUNTER FOR SURVEILLANCE OF CONTRACEPTIVE PILLS: ICD-10-CM

## 2020-09-02 NOTE — TELEPHONE ENCOUNTER
"Requested Prescriptions   Pending Prescriptions Disp Refills     drospirenone-ethinyl estradiol (LORYNA) 3-0.02 MG tablet 112 tablet 4     Sig: Take 1 tablet by mouth daily Discard the inactive pills and continue with the new pack on day 22       Contraceptives Protocol Failed - 9/2/2020 11:51 AM        Failed - Recent (12 mo) or future (30 days) visit within the authorizing provider's specialty     Patient has had an office visit with the authorizing provider or a provider within the authorizing providers department within the previous 12 mos or has a future within next 30 days. See \"Patient Info\" tab in inbasket, or \"Choose Columns\" in Meds & Orders section of the refill encounter.              Passed - Patient is not a current smoker if age is 35 or older        Passed - Medication is active on med list        Passed - No active pregnancy on record        Passed - No positive pregnancy test in past 12 months           Pt's last OV was on 7/3/2019.  Pt is overdue for an OV.  Will send pt a rSmart message reminding her to schedule a yearly physical before we can authorize a refill.    Jemma Watters RN      "

## 2020-09-02 NOTE — TELEPHONE ENCOUNTER
drospirenone-ethinyl estradiol (LORYNA) 3-0.02 MG tablet 1 tablet, DAILY 4 ordered  EditCancel Reorder       Summary: Take 1 tablet by mouth daily Discard the inactive pills and continue with the new pack on day 22, Disp-112 tablet, R-4, E-Prescribe   Dose, Route, Frequency: 1 tablet, Oral, DAILY  Start: 7/3/2019  Ord/Sold: 7/3/2019 (O)  Report  Adh:   Taking:   Long-term:   Pharmacy: [id number] [place] [address] AT [facility] [address]  Med Dose History       Patient Sig: Take 1 tablet by mouth daily Discard the inactive pills and continue with the new pack on day 22       Ordered on: 7/3/2019       Authorized by: JAMISON DOMINGUEZ       Dispense: 112 tablet       Admin Instructions: Discard the inactive pills and continue with the new pack on day         Lisa Stone M.A.

## 2020-09-07 ENCOUNTER — MYC REFILL (OUTPATIENT)
Dept: OBGYN | Facility: CLINIC | Age: 30
End: 2020-09-07

## 2020-09-07 DIAGNOSIS — Z30.41 ENCOUNTER FOR SURVEILLANCE OF CONTRACEPTIVE PILLS: ICD-10-CM

## 2020-09-07 RX ORDER — DROSPIRENONE AND ETHINYL ESTRADIOL 0.02-3(28)
1 KIT ORAL DAILY
Qty: 112 TABLET | Refills: 4 | Status: CANCELLED | OUTPATIENT
Start: 2020-09-07

## 2020-09-08 NOTE — TELEPHONE ENCOUNTER
See [employer] message sent to pt on 9/2.  She needs to schedule a yearly physical prior to sending in a refill.    Jemma Watters RN

## 2020-09-10 NOTE — TELEPHONE ENCOUNTER
Pt scheduled appt for 9/16/2020.    RN sent [employer] message asking if pt needs refill prior to her appt.    Yamileth Wagner RN on 9/10/2020 at 12:32 PM

## 2020-09-11 RX ORDER — DROSPIRENONE AND ETHINYL ESTRADIOL 0.02-3(28)
1 KIT ORAL DAILY
Qty: 28 TABLET | Refills: 0 | Status: SHIPPED | OUTPATIENT
Start: 2020-09-11 | End: 2020-09-30

## 2020-09-30 ENCOUNTER — OFFICE VISIT (OUTPATIENT)
Dept: OBGYN | Facility: CLINIC | Age: 30
End: 2020-09-30
Payer: COMMERCIAL

## 2020-09-30 VITALS
HEIGHT: 70 IN | DIASTOLIC BLOOD PRESSURE: 75 MMHG | OXYGEN SATURATION: 97 % | WEIGHT: 180 LBS | SYSTOLIC BLOOD PRESSURE: 112 MMHG | HEART RATE: 67 BPM | BODY MASS INDEX: 25.77 KG/M2

## 2020-09-30 DIAGNOSIS — R87.810 ASCUS WITH POSITIVE HIGH RISK HPV CERVICAL: ICD-10-CM

## 2020-09-30 DIAGNOSIS — Z13.220 SCREENING CHOLESTEROL LEVEL: ICD-10-CM

## 2020-09-30 DIAGNOSIS — Z13.1 SCREENING FOR DIABETES MELLITUS: ICD-10-CM

## 2020-09-30 DIAGNOSIS — Z01.419 ENCOUNTER FOR GYNECOLOGICAL EXAMINATION WITHOUT ABNORMAL FINDING: Primary | ICD-10-CM

## 2020-09-30 DIAGNOSIS — R87.610 ASCUS WITH POSITIVE HIGH RISK HPV CERVICAL: ICD-10-CM

## 2020-09-30 DIAGNOSIS — Z30.41 ENCOUNTER FOR SURVEILLANCE OF CONTRACEPTIVE PILLS: ICD-10-CM

## 2020-09-30 DIAGNOSIS — F41.1 GENERALIZED ANXIETY DISORDER: ICD-10-CM

## 2020-09-30 PROCEDURE — 99395 PREV VISIT EST AGE 18-39: CPT | Performed by: OBSTETRICS & GYNECOLOGY

## 2020-09-30 PROCEDURE — 88175 CYTOPATH C/V AUTO FLUID REDO: CPT | Performed by: OBSTETRICS & GYNECOLOGY

## 2020-09-30 PROCEDURE — 87624 HPV HI-RISK TYP POOLED RSLT: CPT | Performed by: OBSTETRICS & GYNECOLOGY

## 2020-09-30 PROCEDURE — 88141 CYTOPATH C/V INTERPRET: CPT | Performed by: OBSTETRICS & GYNECOLOGY

## 2020-09-30 RX ORDER — DROSPIRENONE AND ETHINYL ESTRADIOL 0.02-3(28)
1 KIT ORAL DAILY
Qty: 112 TABLET | Refills: 4 | Status: SHIPPED | OUTPATIENT
Start: 2020-09-30 | End: 2024-07-15

## 2020-09-30 RX ORDER — DROSPIRENONE AND ETHINYL ESTRADIOL 0.02-3(28)
1 KIT ORAL DAILY
Qty: 112 TABLET | Refills: 4 | Status: CANCELLED | OUTPATIENT
Start: 2020-09-30

## 2020-09-30 RX ORDER — SERTRALINE HYDROCHLORIDE 100 MG/1
100 TABLET, FILM COATED ORAL DAILY
Qty: 90 TABLET | Refills: 1 | Status: SHIPPED | OUTPATIENT
Start: 2020-09-30 | End: 2021-05-04

## 2020-09-30 ASSESSMENT — PATIENT HEALTH QUESTIONNAIRE - PHQ9: SUM OF ALL RESPONSES TO PHQ QUESTIONS 1-9: 3

## 2020-09-30 ASSESSMENT — MIFFLIN-ST. JEOR: SCORE: 1602.75

## 2020-09-30 NOTE — PROGRESS NOTES
Tiffanie ZIMMERMAN is a 30 year old female , who presents for annual exam.   No unusual bleeding, no incontinence, or unusual discharge.   She is currently using OCPs for contraception.  She does not have any apparent contraindications to use.  She has not had any apparent complications with it's use.    Past Medical History:   Diagnosis Date     ASCUS with positive high risk HPV cervical 12/15/2006, 7/3/19    See problem list     H/O colposcopy with cervical biopsy 2007    GRETA 1       Past Surgical History:   Procedure Laterality Date     C/SECTION, LOW TRANSVERSE         OB History    Para Term  AB Living   2 0 0 0 0 2   SAB TAB Ectopic Multiple Live Births   0 0 0 0 2      # Outcome Date GA Lbr Adonay/2nd Weight Sex Delivery Anes PTL Lv   2  11   3.629 kg (8 lb) F  EPI  CHRISTIAN   1  08   3.175 kg (7 lb) M CS-LTranv Spinal  CHRISTIAN       Gyn History:  Gynecological History         Patient's last menstrual period was 2020 (approximate).     no STD /no PID/no IUD      history of abnormal pap smear:  Yes, but no follow up due to time constrants and then COVID restrictions  Last pap:   Lab Results   Component Value Date    PAP ASC-US 2019           Current Outpatient Medications   Medication Sig Dispense Refill     drospirenone-ethinyl estradiol (LORYNA) 3-0.02 MG tablet Take 1 tablet by mouth daily Discard the inactive pills and continue with the new pack on day 22 28 tablet 0     sertraline (ZOLOFT) 100 MG tablet TAKE 1 TABLET(100 MG) BY MOUTH DAILY 30 tablet 2       No Known Allergies    Social History     Socioeconomic History     Marital status: Single     Spouse name: Not on file     Number of children: 2     Years of education: 16     Highest education level: Not on file   Occupational History     Not on file   Social Needs     Financial resource strain: Not on file     Food insecurity     Worry: Not on file     Inability: Not on file     Transportation  "needs     Medical: Not on file     Non-medical: Not on file   Tobacco Use     Smoking status: Never Smoker     Smokeless tobacco: Never Used   Substance and Sexual Activity     Alcohol use: Yes     Comment: 1 glass of wine daily     Drug use: No     Sexual activity: Yes     Partners: Male     Birth control/protection: Pill   Lifestyle     Physical activity     Days per week: Not on file     Minutes per session: Not on file     Stress: Not on file   Relationships     Social connections     Talks on phone: Not on file     Gets together: Not on file     Attends Denominational service: Not on file     Active member of club or organization: Not on file     Attends meetings of clubs or organizations: Not on file     Relationship status: Not on file     Intimate partner violence     Fear of current or ex partner: Not on file     Emotionally abused: Not on file     Physically abused: Not on file     Forced sexual activity: Not on file   Other Topics Concern     Parent/sibling w/ CABG, MI or angioplasty before 65F 55M? Not Asked   Social History Narrative     Not on file       Family History   Problem Relation Age of Onset     Alcoholism Mother      Depression Mother      No Known Problems Father      Depression Maternal Grandmother      No Known Problems Brother      No Known Problems Brother          ROS:  All negative except as above.      EXAM:  /75 (BP Location: Right arm, Cuff Size: Adult Regular)   Pulse 67   Ht 1.772 m (5' 9.75\")   Wt 81.6 kg (180 lb)   LMP 08/19/2020 (Approximate)   SpO2 97%   BMI 26.01 kg/m    General:  WNWD female, NAD  Alert  Oriented x 3  Gait:  Normal  Skin:  Normal skin turgor  Neurologic:  CN grossly intact, good sensation.    HEENT:  NC/AT, EOMI  Neck:  No masses palpated, symmetrical, carotids +2/4, no bruits heard  Heart:  RRR  Lungs:  Clear   Breasts:  Symmetrical, no dimpling noted, no masses palpated, no discharge expressed  Abdomen:  Non-tender, non-distended.  Vulva: No " external lesions, normal hair distribution, no adenopathy  BUS:  Normal, no masses noted  Urethra:  No hypermobility noted  Urethral meatus:  No masses noted  Vagina: Moist, pink, no abnormal discharge, well rugated, no lesions  Cervix: Smooth, pink, no visible lesions  Uterus: Normal size, anteverted, non-tender, mobile  Ovaries: No mass, non-tender, mobile  Perianal:  No masses noted.   Extremities:  No clubbing, cyanosis, or edema      ASSESSMENT/PLAN   Annual examination   ASCUS pap smear and HR HPV:  Repeat pap smear today  History of major depression.   Refill of the Zoloft for 6 months.  Call for the PHQ-9 and refill, after that.   OCP check.  Refill OCPs today   Low fat diet, weight bearing exercises and self breast exams on a monthly basis have been recommended.  I have discussed with patient the risks, benefits, medications, treatment options and modalities.   I have instructed the patient to call or schedule a follow-up appointment if any problems.

## 2020-09-30 NOTE — TELEPHONE ENCOUNTER
Refill not appropriate.  Rx sent to the requesting pharmacy on 9/30/2020 for a 3 month supply with an additional 4 refills.    Jemma Watters RN

## 2020-10-07 LAB
COPATH REPORT: ABNORMAL
PAP: ABNORMAL

## 2020-10-09 ENCOUNTER — PATIENT OUTREACH (OUTPATIENT)
Dept: OBGYN | Facility: CLINIC | Age: 30
End: 2020-10-09

## 2020-10-09 DIAGNOSIS — R87.810 ASCUS WITH POSITIVE HIGH RISK HPV CERVICAL: ICD-10-CM

## 2020-10-09 DIAGNOSIS — R87.610 ASCUS WITH POSITIVE HIGH RISK HPV CERVICAL: ICD-10-CM

## 2020-10-09 NOTE — TELEPHONE ENCOUNTER
12/15/06 ASCUS pap, + HR HPV (not 16 or 18). in Care Everywhere  1/29/07 Munday: GRETA 1  1/21/08 NIL pap  12/10/08 NIL pap  5/12/15 NIL pap  5/27/16 NIL pap  8/9/17 NIL pap Plan: Cotest in 3 years, as patient will be > 29 yo.  7/3/19 ASCUS pap with + HR HPV (not 16 or 18). Plan colp.   9/30/20 ASCUS pap, + HR HPV (not 16 or 18). Plan colp due by 12/30/20

## 2020-11-09 ENCOUNTER — VIRTUAL VISIT (OUTPATIENT)
Dept: FAMILY MEDICINE | Facility: OTHER | Age: 30
End: 2020-11-09

## 2020-11-10 NOTE — PROGRESS NOTES
"Date: 2020 18:33:24  Clinician: Luke Akbar  Clinician NPI: [id number]  Patient: Tiffanie Benito  Patient : 1990  Patient Address: [facility] [address], [place] [address]  Patient Phone: [phone]  Visit Protocol: URI  Patient Summary:  Tiffanie is a 30 year old ( : 1990 ) female who initiated a [facility] Visit for COVID-19 (Coronavirus) evaluation and screening. When asked the question \"Please sign me up to receive news, health information and promotions. \", Tiffanie responded \"No\".    Tiffanie states her symptoms started gradually 3-4 days ago. After her symptoms started, they improved and then got worse again.   Her symptoms consist of myalgia, chills, malaise, a headache, wheezing, a cough, and nasal congestion. Tiffanie also feels feverish.   Symptom details     Nasal secretions: The color of her mucus is clear.    Cough: Tiffanie coughs a few times an hour and her cough is not more bothersome at night. Phlegm does not come into her throat when she coughs. She does not believe her cough is caused by post-nasal drip.     Temperature: Her current temperature is 98.9 degrees Fahrenheit.     Wheezing: Tiffanie has not ever been diagnosed with asthma. Additional wheezing details as reported by the patient (free text): Light crispy sound when deep breath       Headache: She states the headache is mild (1-3 on a 10 point pain scale).      Tiffanie denies having vomiting, rhinitis, facial pain or pressure, sore throat, teeth pain, ageusia, diarrhea, ear pain, nausea, and anosmia. She also denies taking antibiotic medication in the past month, having recent facial or sinus surgery in the past 60 days, and having a sinus infection within the past year. She is not experiencing dyspnea.   Precipitating events  She has not recently been exposed to someone with influenza. Tiffanie has not been in close contact with any high risk individuals.   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 In the past 14 days, Tiffanie has worked or volunteered at a hospital or a clinic. 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   Tiffanie has had a close contact with a laboratory-confirmed COVID-19 patient within 14 days of symptom onset. She was exposed at her work. She does not know when she was exposed to the laboratory-confirmed COVID-19 patient.   Additional information about contact with COVID-19 (Coronavirus) patient as reported by the patient (free text): Co worker tested pos    Since December 2019, Tiffanie has not been tested for COVID-19 and has not had upper respiratory infection or influenza-like illness.   Pertinent medical history  Tiffanie does not get yeast infections when she takes antibiotics.   Tiffanie needs a return to work/school note.   Weight: 178 lbs   Tiffanie does not smoke or use smokeless tobacco.   She denies pregnancy and denies breastfeeding. She has menstruated in the past month.   Weight: 178 lbs    MEDICATIONS: No current medications, ALLERGIES: NKDA  Clinician Response:  Dear Tiffanie,   Your symptoms show that you may have coronavirus (COVID-19). This illness can cause fever, cough and trouble breathing. Many people get a mild case and get better on their own. Some people can get very sick.  Based on the symptoms you have shared, I would like you to be re-checked in 2 to 3 days. Please call your family clinic to set up a video or phone visit.  Will I be tested for COVID-19?  We would like to test you for this virus.   Please call [phone] to schedule your visit. Explain that you were referred by [facility] to have a COVID-19 test. Be ready to share your [facility] visit ID number.   * If you need to schedule in [place] or [place] please call [phone] or for [employer] employees  "please call [phone].    The following will serve as your written order for this COVID Test, ordered by me, for the indication of suspected COVID [Z20.828]: The test will be ordered in [employer], our electronic health record, after you are scheduled. It will show as ordered and authorized by Heriberto Fuenets MD.  Order: COVID-19 (Coronavirus) PCR for SYMPTOMATIC testing from [facility].   1.When it's time for your COVID test:   Stay at least 6 feet away from others. (If someone will drive you to your test, stay in the backseat, as far away from the  as you can.)   Cover your mouth and nose with a mask, tissue or washcloth.  Go straight to the testing site. Don't make any stops on the way there or back.      2.Starting now: Stay home and away from others (self-isolate) until:   You've had no fever---and no medicine that reduces fever---for one full day (24 hours). And...   Your other symptoms have gotten better. For example, your cough or breathing has improved. And...   At least 10 days have passed since your symptoms started.       During this time, don't leave the house except for testing or medical care.   Stay in your own room, even for meals. Use your own bathroom if you can.   Stay away from others in your home. No hugging, kissing or shaking hands. No visitors.  Don't go to work, school or anywhere else.    Clean \"high touch\" surfaces often (doorknobs, counters, handles, etc.). Use a household cleaning spray or wipes. You'll find a full list of  on the EPA website: www.epa.gov/pesticide-registration/list-n-disinfectants-use-against-sars-cov-2.   Cover your mouth and nose with a mask, tissue or washcloth to avoid spreading germs.  Wash your hands and face often. Use soap and water.  Caregivers in these groups are at risk for severe illness due to COVID-19:  o People 65 years and older  o People who live in a nursing home or long-term care facility  o People with chronic disease (lung, heart, cancer, " diabetes, kidney, liver, immunologic)   o People who have a weakened immune system, including those who:   Are in cancer treatment  Take medicine that weakens the immune system, such as corticosteroids  Had a bone marrow or organ transplant  Have an immune deficiency  Have poorly controlled HIV or AIDS  Are obese (body mass index of 40 or higher)  Smoke regularly   o Caregivers should wear gloves while washing dishes, handling laundry and cleaning bedrooms and bathrooms.  o Use caution when washing and drying laundry: Don't shake dirty laundry, and use the warmest water setting that you can.  o For more tips, go to www.cdc.gov/coronavirus/2019-ncov/downloads/10Things.pdf.      How can I take care of myself?   Get lots of rest. Drink extra fluids (unless a doctor has told you not to)   Take Tylenol (acetaminophen) for fever or pain. If you have liver or kidney problems, ask your family doctor if it's okay to take Tylenol.   Adults can take either:    650 mg (two 325 mg pills) every 4 to 6 hours, or...   1,000 mg (two 500 mg pills) every 8 hours as needed.    Note: Don't take more than 3,000 mg in one day. Acetaminophen is found in many medicines (both prescribed and over-the-counter medicines). Read all labels to be sure you don't take too much.   For children, check the Tylenol bottle for the right dose. The dose is based on the child's age or weight.    If you have other health problems (like cancer, heart failure, an organ transplant or severe kidney disease): Call your specialty clinic if you don't feel better in the next 2 days.       Know when to call 911. Emergency warning signs include:    Trouble breathing or shortness of breath Pain or pressure in the chest that doesn't go away Feeling confused like you haven't felt before, or not being able to wake up Bluish-colored lips or face  Where can I get more information?   [facility] -- About COVID-19: [email or web address]   CDC -- What to Do If  You're Sick: www.cdc.gov/coronavirus/2019-ncov/about/steps-when-sick.html   CDC -- Ending Home Isolation: www.cdc.gov/coronavirus/2019-ncov/hcp/disposition-in-home-patients.html   [facility] -- Caring for Someone: www.cdc.gov/coronavirus/2019-ncov/if-you-are-sick/care-for-someone.html   [facility] -- Interim Guidance for Hospital Discharge to Home: www.[facility].[place]./diseases/coronavirus/hcp/hospdischarge.pdf   [facility] clinical trials (COVID-19 research studies): clinicalaffairs.[facility]-clinical-trials    Below are the COVID-19 hotlines at the [place] [facility] ([facility]). Interpreters are available.    For health questions: Call 504-121-4693 or 1-784.510.7030 (7 a.m. to 7 p.m.) For questions about schools and childcare: Call 142-328-9225 or 1-893.956.7411 (7 a.m. to 7 p.m.)       Diagnosis: Cough  Diagnosis ICD: R05

## 2020-11-11 ENCOUNTER — RESULTS ONLY (OUTPATIENT)
Dept: LAB | Age: 30
End: 2020-11-11

## 2020-11-11 ENCOUNTER — APPOINTMENT (OUTPATIENT)
Dept: FAMILY MEDICINE | Facility: CLINIC | Age: 30
End: 2020-11-11
Payer: COMMERCIAL

## 2020-11-11 LAB
SARS-COV-2 RNA SPEC QL NAA+PROBE: NORMAL
SPECIMEN SOURCE: NORMAL

## 2020-11-12 LAB
LABORATORY COMMENT REPORT: ABNORMAL
SARS-COV-2 RNA SPEC QL NAA+PROBE: POSITIVE
SPECIMEN SOURCE: ABNORMAL

## 2020-11-13 ENCOUNTER — AMBULATORY - HEALTHEAST (OUTPATIENT)
Dept: FAMILY MEDICINE | Facility: CLINIC | Age: 30
End: 2020-11-13

## 2020-11-13 DIAGNOSIS — Z20.822 SUSPECTED 2019 NOVEL CORONAVIRUS INFECTION: ICD-10-CM

## 2020-11-14 ENCOUNTER — COMMUNICATION - HEALTHEAST (OUTPATIENT)
Dept: SCHEDULING | Facility: CLINIC | Age: 30
End: 2020-11-14

## 2020-11-22 ENCOUNTER — HEALTH MAINTENANCE LETTER (OUTPATIENT)
Age: 30
End: 2020-11-22

## 2020-12-08 ENCOUNTER — OFFICE VISIT (OUTPATIENT)
Dept: OBGYN | Facility: CLINIC | Age: 30
End: 2020-12-08
Payer: COMMERCIAL

## 2020-12-08 VITALS
HEART RATE: 61 BPM | WEIGHT: 185.9 LBS | BODY MASS INDEX: 26.87 KG/M2 | OXYGEN SATURATION: 97 % | SYSTOLIC BLOOD PRESSURE: 118 MMHG | DIASTOLIC BLOOD PRESSURE: 82 MMHG

## 2020-12-08 DIAGNOSIS — R87.610 ASCUS WITH POSITIVE HIGH RISK HPV CERVICAL: Primary | ICD-10-CM

## 2020-12-08 DIAGNOSIS — R87.810 ASCUS WITH POSITIVE HIGH RISK HPV CERVICAL: Primary | ICD-10-CM

## 2020-12-08 PROCEDURE — 88305 TISSUE EXAM BY PATHOLOGIST: CPT | Performed by: PATHOLOGY

## 2020-12-08 PROCEDURE — 88342 IMHCHEM/IMCYTCHM 1ST ANTB: CPT | Performed by: PATHOLOGY

## 2020-12-08 PROCEDURE — 57454 BX/CURETT OF CERVIX W/SCOPE: CPT | Performed by: OBSTETRICS & GYNECOLOGY

## 2020-12-08 PROCEDURE — 99213 OFFICE O/P EST LOW 20 MIN: CPT | Mod: 25 | Performed by: OBSTETRICS & GYNECOLOGY

## 2020-12-08 NOTE — PROGRESS NOTES
Patient Name: Tiffanie ZIMMERMAN              Date: 12/8/2020   YOB: 1990                         Age: 30 year old   Phone: [phone] (home)   ________________________________________________________________________  Tiffanie presents today to discuss the pap smear, findings and possible further evaluation.  The patient's pap smear history is as noted:  12/15/06 ASCUS pap, + HR HPV (not 16 or 18). in Care Everywhere  1/29/07 [place]: GRETA 1  1/21/08 NIL pap  12/10/08 NIL pap  5/12/15 NIL pap  5/27/16 NIL pap  8/9/17 NIL pap Plan: Cotest in 3 years, as patient will be > 27 yo.  7/3/19 ASCUS pap, + HR HPV (not 16 or 18). Plan colp. Patient did not complete and she was considered lost to follow up.    9/30/20 ASCUS pap, + HR HPV (not 16 or 18).   I attempted to ensure that the patient was educated regarding the nature of her findings and implications to date.  We reviewed the role of HPV, incidence in the population and the natural history of the infection, and its transmission.  We also reviewed ways to minimize her future risk, the effect of HPV on the cervix and treatment options available, should they be indicated.    The pathophysiology of the cervix, including a discussion of the squamous and columnar cells, metaplasia and dysplasia have been reviewed, drawings, sketches and the pamphlets were reviewed with her.      Patient's last menstrual period was 11/28/2020 (exact date).  Current Birth Control Method: oral contraceptive  Age at first sexual intercourse: 15 years old  Number of sexual partners (lifetime): did not disclose   History of veneral diseases: HPV  History of genital warts:  No  Visible warts now?:  No  Family History of  Cervical, Uterine or Vaginal Cancer?: No    Past Medical History:   Diagnosis Date     ASCUS with positive high risk HPV cervical 12/15/2006, 7/3/19    9/30/20     H/O colposcopy with cervical biopsy 01/29/2007    GRETA 1       Past Surgical History:   Procedure Laterality  Date     C/SECTION, LOW TRANSVERSE  2008        Outpatient Encounter Medications as of 12/8/2020   Medication Sig Dispense Refill     drospirenone-ethinyl estradiol (LORYNA) 3-0.02 MG tablet Take 1 tablet by mouth daily Discard the inactive pills and continue with the new pack on day 22 112 tablet 4     sertraline (ZOLOFT) 100 MG tablet Take 1 tablet (100 mg) by mouth daily 90 tablet 1     No facility-administered encounter medications on file as of 12/8/2020.         Allergies as of 12/08/2020     (No Known Allergies)       Social History     Socioeconomic History     Marital status: Single     Spouse name: None     Number of children: 2     Years of education: 16     Highest education level: None   Occupational History     None   Social Needs     Financial resource strain: None     Food insecurity     Worry: None     Inability: None     Transportation needs     Medical: None     Non-medical: None   Tobacco Use     Smoking status: Never Smoker     Smokeless tobacco: Never Used   Substance and Sexual Activity     Alcohol use: Yes     Comment: 1 glass of wine daily     Drug use: No     Sexual activity: Yes     Partners: Male     Birth control/protection: Pill   Lifestyle     Physical activity     Days per week: None     Minutes per session: None     Stress: None   Relationships     Social connections     Talks on phone: None     Gets together: None     Attends Sabianism service: None     Active member of club or organization: None     Attends meetings of clubs or organizations: None     Relationship status: None     Intimate partner violence     Fear of current or ex partner: None     Emotionally abused: None     Physically abused: None     Forced sexual activity: None   Other Topics Concern     Parent/sibling w/ CABG, MI or angioplasty before 65F 55M? Not Asked   Social History Narrative     None        Family History   Problem Relation Age of Onset     Alcoholism Mother      Depression Mother      No Known Problems  Father      Depression Maternal Grandmother      No Known Problems Brother      No Known Problems Brother          Review Of Systems  10 point ROS of systems including Constitutional, Eyes, Respiratory, Cardiovascular, Gastroenterology, Genitourinary, Integumentary, Muscularskeletal, Psychiatric were all negative except for pertinent positives noted in my HPI and in the PMH.      Exam:   /82 (BP Location: Right arm, Cuff Size: Adult Regular)   Pulse 61   Wt 84.3 kg (185 lb 14.4 oz)   LMP 11/28/2020 (Exact Date)   SpO2 97%   BMI 26.87 kg/m    GENERAL:  WNWD female NAD  HEENT: NC/AT, EOMI  Lungs:  Good respiratory effort   SKIN: normal skin turgor  GAIT: Normal  NECK: Symmetrical, no masses noted   VULVA: Normal Genitalia  BUS: Normal  URETHRA:  No hypermobility noted  URETHRAL MEATUS:  No masses noted  VAGINA: Normal mucosa, no discharge  CERVIX: Closed, mobile, no discharge  PERIANAL:  No masses or lesions seen  EXTREMITIES: no clubbing, cyanosis, or edema    Assessment:  ASCUS and high risk HPV    Plan:  Recommend to Proceed with Colpo  The details of the colposcopic procedure were reviewed, the risks of missed diagnoses, pain, infection, and bleeding.    TT 20 min, in addition to the time for the procedure  CT greater than 50%, as noted above in the HPI and in the Plan.     Giovanni Salomon MD        Procedure:  Procedure for colposcopy and biopsy has been explained to the patient and consent obtained.    Before the procedure, it was ensured that the patient was educated regarding the nature of her findings and implications to date.  We reviewed the role of HPV and the natural history of the infection.  We also reviewed ways to minimize her future risk, the effect of HPV on the cervix and treatment options available, should they be indicated.    The pathophysiology of the cervix, including a discussion of the squamous and columnar cells, metaplasia and dysplasia have been reviewed, drawings, sketches and  the pamphlets were reviewed with her.  The details of the colposcopic procedure were reviewed, the risks of missed diagnoses, pain, infection, and bleeding.  Questions seemed to be answered before proceeding and the patient then consented to the procedure.     Speculum placed in vagina and excellent visualization of cervix achieved, cervix swabbed  with acetic acid solution.    biopsies taken (including ECC): 3   Hemostasis effected with Silver Nitrate.     Findings:  Cervix: acetowhitening noted on the anterior cervix, at the TZ, between 123 and 3 o'clock   Vaginal inspection: no visible lesions.  Procedure Summary: Patient tolerated procedure well and colposcopy adequate.      Assessment:   ASCUS pap smear, High Risk HPV (not 16 or  18)    Plan:  Specimens labelled and sent to pathology.  Will base further treatment on pathology findings.  Post biopsy instructions given to patient and call to discuss Pathology results.    Giovanin Salomon MD

## 2020-12-11 LAB — COPATH REPORT: NORMAL

## 2020-12-14 ENCOUNTER — PATIENT OUTREACH (OUTPATIENT)
Dept: OBGYN | Facility: CLINIC | Age: 30
End: 2020-12-14

## 2020-12-14 NOTE — TELEPHONE ENCOUNTER
12/15/06 ASCUS pap, + HR HPV (not 16 or 18). in Care Everywhere  1/29/07 [place]: GRETA 1  1/21/08 NIL pap  12/10/08 NIL pap  5/12/15 NIL pap  5/27/16 NIL pap  8/9/17 NIL pap Plan: Cotest in 3 years, as patient will be > 29 yo.  7/3/19 ASCUS pap, + HR HPV (not 16 or 18). Plan colp.   9/30/20 ASCUS pap, + HR HPV (not 16 or 18). Plan colp due by 12/30/20  10/9/20 LM for pt to call us back.   10/12/20 Pt notified by phone.   12/8/20 [place] Bx - GRETA 1, ECC - no GRETA. Cotest due in 1 year.   12/12/20 Pt viewed result on [employer].

## 2021-05-04 DIAGNOSIS — F41.1 GENERALIZED ANXIETY DISORDER: ICD-10-CM

## 2021-05-04 RX ORDER — SERTRALINE HYDROCHLORIDE 100 MG/1
100 TABLET, FILM COATED ORAL DAILY
Qty: 90 TABLET | Refills: 1 | Status: SHIPPED | OUTPATIENT
Start: 2021-05-04 | End: 2021-08-06

## 2021-05-04 NOTE — TELEPHONE ENCOUNTER
"Requested Prescriptions   Pending Prescriptions Disp Refills     sertraline (ZOLOFT) 100 MG tablet 90 tablet 1     Sig: Take 1 tablet (100 mg) by mouth daily       SSRIs Protocol Passed - 5/4/2021  3:13 PM        Passed - Recent (12 mo) or future (30 days) visit within the authorizing provider's specialty     Patient has had an office visit with the authorizing provider or a provider within the authorizing providers department within the previous 12 mos or has a future within next 30 days. See \"Patient Info\" tab in inbasket, or \"Choose Columns\" in Meds & Orders section of the refill encounter.              Passed - Medication is active on med list        Passed - Patient is age 18 or older        Passed - No active pregnancy on record        Passed - No positive pregnancy test in last 12 months           Last seen on 12/8/2020 to discuss PAP smear results.    Last prescribed on 9/30/20 90 tablets with 1 refill.    Prescription approved per [facility] Refill Protocol.  ILIANA NoblesN RN    "

## 2021-06-23 ENCOUNTER — OFFICE VISIT (OUTPATIENT)
Dept: FAMILY MEDICINE | Facility: CLINIC | Age: 31
End: 2021-06-23
Payer: COMMERCIAL

## 2021-06-23 VITALS
BODY MASS INDEX: 26.57 KG/M2 | HEART RATE: 69 BPM | HEIGHT: 70 IN | WEIGHT: 185.6 LBS | OXYGEN SATURATION: 100 % | TEMPERATURE: 97.5 F | SYSTOLIC BLOOD PRESSURE: 112 MMHG | DIASTOLIC BLOOD PRESSURE: 75 MMHG

## 2021-06-23 DIAGNOSIS — L98.9 SKIN LESION: ICD-10-CM

## 2021-06-23 DIAGNOSIS — R41.840 INATTENTION: Primary | ICD-10-CM

## 2021-06-23 DIAGNOSIS — F32.4 MAJOR DEPRESSIVE DISORDER IN PARTIAL REMISSION, UNSPECIFIED WHETHER RECURRENT (H): ICD-10-CM

## 2021-06-23 PROBLEM — F33.1 MODERATE EPISODE OF RECURRENT MAJOR DEPRESSIVE DISORDER (H): Status: ACTIVE | Noted: 2021-06-23

## 2021-06-23 PROCEDURE — 96127 BRIEF EMOTIONAL/BEHAV ASSMT: CPT | Mod: 59 | Performed by: PHYSICIAN ASSISTANT

## 2021-06-23 PROCEDURE — 17110 DESTRUCTION B9 LES UP TO 14: CPT | Performed by: PHYSICIAN ASSISTANT

## 2021-06-23 PROCEDURE — 99213 OFFICE O/P EST LOW 20 MIN: CPT | Mod: 25 | Performed by: PHYSICIAN ASSISTANT

## 2021-06-23 ASSESSMENT — ANXIETY QUESTIONNAIRES
1. FEELING NERVOUS, ANXIOUS, OR ON EDGE: SEVERAL DAYS
2. NOT BEING ABLE TO STOP OR CONTROL WORRYING: NOT AT ALL
3. WORRYING TOO MUCH ABOUT DIFFERENT THINGS: SEVERAL DAYS
5. BEING SO RESTLESS THAT IT IS HARD TO SIT STILL: SEVERAL DAYS
6. BECOMING EASILY ANNOYED OR IRRITABLE: NOT AT ALL
GAD7 TOTAL SCORE: 5
IF YOU CHECKED OFF ANY PROBLEMS ON THIS QUESTIONNAIRE, HOW DIFFICULT HAVE THESE PROBLEMS MADE IT FOR YOU TO DO YOUR WORK, TAKE CARE OF THINGS AT HOME, OR GET ALONG WITH OTHER PEOPLE: SOMEWHAT DIFFICULT
7. FEELING AFRAID AS IF SOMETHING AWFUL MIGHT HAPPEN: SEVERAL DAYS

## 2021-06-23 ASSESSMENT — MIFFLIN-ST. JEOR: SCORE: 1623.16

## 2021-06-23 ASSESSMENT — PATIENT HEALTH QUESTIONNAIRE - PHQ9
SUM OF ALL RESPONSES TO PHQ QUESTIONS 1-9: 6
5. POOR APPETITE OR OVEREATING: SEVERAL DAYS

## 2021-06-23 NOTE — PROGRESS NOTES
"    Assessment & Plan     Inattention  - MENTAL HEALTH REFERRAL  - Adult; Assessments and Testing; ADHD; [facility]: [facility] [phone]; We will contact you to schedule the appointment or please call with any questions    Skin lesion  -Site care as reviewed.     Major depressive disorder in partial remission, unspecified whether recurrent (H)  - follow up in clinic after ADHD testing.       BMI:   Estimated body mass index is 26.82 kg/m  as calculated from the following:    Height as of this encounter: 1.772 m (5' 9.75\").    Weight as of this encounter: 84.2 kg (185 lb 9.6 oz).           Return in about 4 weeks (around 7/21/2021) for Follow up.    Dmitry Strong PA-C  [facility] JOSE Moreno is a 31 year old who presents for the following health issues  accompanied by her self:    HPI     Patient presents with:  Eval/Assessment: for ADD   Anxiety    PHQ-9 and JOSÉ MIGUEL-7 obtained and reviewed. Patient notes that she is ok where she is at and attributes her current sx to untreated ADHD.  Notes that many family members have it.  She also c/o a skin lesion on her upper back for many years that intermittently gets irritated.  She is amenable to cryodestruction as it is in a sun exposed area.     Review of Systems   Constitutional, HEENT, cardiovascular, pulmonary, gi and gu systems are negative, except as otherwise noted.      Objective    /75   Pulse 69   Temp 97.5  F (36.4  C) (Oral)   Ht 1.772 m (5' 9.75\")   Wt 84.2 kg (185 lb 9.6 oz)   SpO2 100%   BMI 26.82 kg/m    Body mass index is 26.82 kg/m .  Physical Exam   GENERAL: healthy, alert and no distress  MS: no gross musculoskeletal defects noted, no edema  SKIN: flesh colored excoriated papular lesion on upper back 0.4 cm in size.   PSYCH: Psych: Alert and oriented times 3; coherent speech, normal   rate and volume, able to articulate logical thoughts, able   to abstract reason, no tangential thoughts, " no hallucinations   or delusions  Her affect is congruent.    Procedure Note: Liquid nitrogen was applied to lesion to 2 mm ice ball formation x2 freeze thaw cycles to good effect and no sequelae.  Site care reviewed.  Follow up in 2-4 weeks for repeat treatment recommended as needed.

## 2021-06-24 ASSESSMENT — ANXIETY QUESTIONNAIRES: GAD7 TOTAL SCORE: 5

## 2021-08-02 ENCOUNTER — MYC MEDICAL ADVICE (OUTPATIENT)
Dept: FAMILY MEDICINE | Facility: CLINIC | Age: 31
End: 2021-08-02

## 2021-08-06 ENCOUNTER — VIRTUAL VISIT (OUTPATIENT)
Dept: FAMILY MEDICINE | Facility: CLINIC | Age: 31
End: 2021-08-06
Payer: COMMERCIAL

## 2021-08-06 DIAGNOSIS — F33.1 MODERATE EPISODE OF RECURRENT MAJOR DEPRESSIVE DISORDER (H): Primary | ICD-10-CM

## 2021-08-06 DIAGNOSIS — F41.1 GENERALIZED ANXIETY DISORDER: ICD-10-CM

## 2021-08-06 PROCEDURE — 99214 OFFICE O/P EST MOD 30 MIN: CPT | Mod: 95 | Performed by: PHYSICIAN ASSISTANT

## 2021-08-06 RX ORDER — HYDROXYZINE HYDROCHLORIDE 25 MG/1
25 TABLET, FILM COATED ORAL EVERY 4 HOURS PRN
Qty: 24 TABLET | Refills: 1 | Status: SHIPPED | OUTPATIENT
Start: 2021-08-06 | End: 2021-09-30

## 2021-08-06 RX ORDER — SERTRALINE HYDROCHLORIDE 100 MG/1
150 TABLET, FILM COATED ORAL DAILY
Qty: 45 TABLET | Refills: 1 | Status: SHIPPED | OUTPATIENT
Start: 2021-08-06 | End: 2022-01-04

## 2021-08-06 ASSESSMENT — ANXIETY QUESTIONNAIRES
2. NOT BEING ABLE TO STOP OR CONTROL WORRYING: NEARLY EVERY DAY
GAD7 TOTAL SCORE: 16
5. BEING SO RESTLESS THAT IT IS HARD TO SIT STILL: SEVERAL DAYS
IF YOU CHECKED OFF ANY PROBLEMS ON THIS QUESTIONNAIRE, HOW DIFFICULT HAVE THESE PROBLEMS MADE IT FOR YOU TO DO YOUR WORK, TAKE CARE OF THINGS AT HOME, OR GET ALONG WITH OTHER PEOPLE: VERY DIFFICULT
7. FEELING AFRAID AS IF SOMETHING AWFUL MIGHT HAPPEN: MORE THAN HALF THE DAYS
3. WORRYING TOO MUCH ABOUT DIFFERENT THINGS: NEARLY EVERY DAY
6. BECOMING EASILY ANNOYED OR IRRITABLE: MORE THAN HALF THE DAYS
1. FEELING NERVOUS, ANXIOUS, OR ON EDGE: MORE THAN HALF THE DAYS

## 2021-08-06 ASSESSMENT — PATIENT HEALTH QUESTIONNAIRE - PHQ9
5. POOR APPETITE OR OVEREATING: NEARLY EVERY DAY
SUM OF ALL RESPONSES TO PHQ QUESTIONS 1-9: 21

## 2021-08-06 NOTE — PROGRESS NOTES
Tiffanie is a 31 year old who is being evaluated via a billable video visit.      How would you like to obtain your AVS? MyChart  If the video visit is dropped, the invitation should be resent by: Text to cell phone: [phone]  Will anyone else be joining your video visit? No        Assessment & Plan     Generalized anxiety disorder    - sertraline (ZOLOFT) 100 MG tablet; Take 1.5 tablets (150 mg) by mouth daily  - hydrOXYzine (ATARAX) 25 MG tablet; Take 1 tablet (25 mg) by mouth every 4 hours as needed for anxiety    Moderate episode of recurrent major depressive disorder (H)  - sertraline (ZOLOFT) 100 MG tablet; Take 1.5 tablets (150 mg) by mouth daily  - hydrOXYzine (ATARAX) 25 MG tablet; Take 1 tablet (25 mg) by mouth every 4 hours as needed for anxiety      Depression Screening Follow Up    PHQ 8/6/2021   PHQ-9 Total Score 21   Q9: Thoughts of better off dead/self-harm past 2 weeks Several days     Last PHQ-9 8/6/2021   1.  Little interest or pleasure in doing things 1   2.  Feeling down, depressed, or hopeless 3   3.  Trouble falling or staying asleep, or sleeping too much 3   4.  Feeling tired or having little energy 2   5.  Poor appetite or overeating 3   6.  Feeling bad about yourself 3   7.  Trouble concentrating 2   8.  Moving slowly or restless 3   Q9: Thoughts of better off dead/self-harm past 2 weeks 1   PHQ-9 Total Score 21   Difficulty at work, home, or with people Extremely dIfficult               Follow Up      Follow Up Actions Taken  Referred patient back to mental health provider  Patient to follow up with PCP.  Clinic staff to schedule appointment if able.    Discussed the following ways the patient can remain in a safe environment:  remove alcohol and be around others      Return in about 2 weeks (around 8/20/2021) for Follow up.    JEANA Kunz [facility] [place]    Frantz Moreno is a 31 year old who presents for the following health issues     HPI      Depression and Anxiety Follow-Up    How are you doing with your depression since your last visit? Worsened     How are you doing with your anxiety since your last visit?  Worsened     Are you having other symptoms that might be associated with depression or anxiety? No    Have you had a significant life event? No     Do you have any concerns with your use of alcohol or other drugs? Yes:  alcohol    Social History     Tobacco Use     Smoking status: Never Smoker     Smokeless tobacco: Never Used   Substance Use Topics     Alcohol use: Yes     Comment: 1 glass of wine daily     Drug use: No     PHQ 9/30/2020 6/23/2021 8/6/2021   PHQ-9 Total Score 3 6 21   Q9: Thoughts of better off dead/self-harm past 2 weeks Not at all Not at all Several days     JOSÉ MIGUEL-7 SCORE 2/21/2020 6/23/2021 8/6/2021   Total Score 10 (moderate anxiety) - -   Total Score 10 5 16       Reviewed that Patient should present to ED for panic attacks when accompanied by suicidality.  She is amenable to a dose increase of Sertraline with atarax for acute anxiety in lieu of ETOH and short term follow up.        Review of Systems   Constitutional, HEENT, cardiovascular, pulmonary, gi and gu systems are negative, except as otherwise noted.      Objective           Vitals:  No vitals were obtained today due to virtual visit.    Physical Exam   GENERAL: Healthy, alert and no distress  EYES: Eyes grossly normal to inspection.  No discharge or erythema, or obvious scleral/conjunctival abnormalities.  RESP: No audible wheeze, cough, or visible cyanosis.  No visible retractions or increased work of breathing.    SKIN: Visible skin clear. No significant rash, abnormal pigmentation or lesions.  NEURO: Cranial nerves grossly intact.  Mentation and speech appropriate for age.  PSYCH: mentation appears normal, affect normal/bright, fatigued, judgement and insight intact and appearance well groomed                Video-Visit Details    Type of service:  Video  Visit    Video End Time:15 Minutes    Originating Location (pt. Location): Home    Distant Location (provider location):  [facility]     Platform used for Video Visit: [employer]

## 2021-08-07 ASSESSMENT — ANXIETY QUESTIONNAIRES: GAD7 TOTAL SCORE: 16

## 2021-09-18 ENCOUNTER — HEALTH MAINTENANCE LETTER (OUTPATIENT)
Age: 31
End: 2021-09-18

## 2021-09-28 DIAGNOSIS — F41.1 GENERALIZED ANXIETY DISORDER: ICD-10-CM

## 2021-09-30 RX ORDER — HYDROXYZINE HYDROCHLORIDE 25 MG/1
TABLET, FILM COATED ORAL
Qty: 24 TABLET | Refills: 1 | Status: SHIPPED | OUTPATIENT
Start: 2021-09-30 | End: 2024-07-15

## 2021-09-30 NOTE — TELEPHONE ENCOUNTER
Prescription approved per [facility] Refill Protocol.    ILIANA [place]N RN  [facility]

## 2021-11-04 ENCOUNTER — E-VISIT (OUTPATIENT)
Dept: URGENT CARE | Facility: URGENT CARE | Age: 31
End: 2021-11-04
Payer: COMMERCIAL

## 2021-11-04 DIAGNOSIS — Z20.822 SUSPECTED COVID-19 VIRUS INFECTION: Primary | ICD-10-CM

## 2021-11-04 PROCEDURE — 99421 OL DIG E/M SVC 5-10 MIN: CPT | Performed by: PHYSICIAN ASSISTANT

## 2021-11-04 NOTE — PATIENT INSTRUCTIONS
Dear Tiffanie ZIMMERMAN,    Your symptoms show that you may have coronavirus (COVID-19). This illness can cause fever, cough and trouble breathing. Many people get a mild case and get better on their own. Some people can get very sick.    Will I be tested for COVID-19?  We would like to test you for Covid-19 virus. I have placed orders for this test.     For all employees or close contacts (except [facility] and [facility] - see below), go to your [employer] home page and scroll down to the section that says  You have an appointment that needs to be scheduled  and click the large green button that says  Schedule Now  and follow the steps to find the next available opening.     If you are unable to complete these steps or if you cannot find any available times, please call [phone] to schedule employee testing.     [facility] employees or close contacts, please call [phone].   [facility] employees or close contacts call [phone].    Return to work/school/ guidance:  Please let your workplace manager and staffing office know when your quarantine ends     We can t give you an exact date as it depends on the above. You can calculate this on your own or work with your manager/staffing office to calculate this. (For example if you were exposed on 10/4, you would have to quarantine for 14 full days. That would be through 10/18. You could return on 10/19.)      If you receive a positive COVID-19 test result, follow the guidance of the those who are giving you the results. Usually the return to work is 10 (or in some cases 20 days from symptom onset.) If you work at [employer] [place], you must also be cleared by Employee Occupational Health and Safety to return to work.        If you receive a negative COVID-19 test result and did not have a high risk exposure to someone with a known positive COVID-19 test, you can return to work once you're free of fever for 24 hours without fever-reducing medication  and your symptoms are improving or resolved.      If you receive a negative COVID-19 test and If you had a high risk exposure to someone who has tested positive for COVID-19 then you can return to work 14 days after your last contact with the positive individual    Note: If you have ongoing exposure to the covid positive person, this quarantine period may be more than 14 days. (For example, if you are continued to be exposed to your child who tested positive and cannot isolate from them, then the quarantine of 7-14 days can't start until your child is no longer contagious. This is typically 10 days from onset of the child's symptoms. So the total duration may be 17-24 days in this case.)    Sign up for [employer].   We know it's scary to hear that you might have COVID-19. We want to track your symptoms to make sure you're okay over the next 2 weeks. Please look for an email from [employer]--this is a free, online program that we'll use to keep in touch. To sign up, follow the link in the email you will receive. Learn more at [email or web address]    How can I take care of myself?    Get lots of rest. Drink extra fluids (unless a doctor has told you not to)    Take Tylenol (acetaminophen) or ibuprofen for fever or pain. If you have liver or kidney problems, ask your family doctor if it's okay to take Tylenol o ibuprofen    If you have other health problems (like cancer, heart failure, an organ transplant or severe kidney disease): Call your specialty clinic if you don't feel better in the next 2 days.    Know when to call 911. Emergency warning signs include:  o Trouble breathing or shortness of breath  o Pain or pressure in the chest that doesn't go away  o Feeling confused like you haven't felt before, or not being able to wake up  o Bluish-colored lips or face    Where can I get more information?   [employer] [facility] - About COVID-19:   [email or web address]    CDC - What to Do If You're  Sick:   www.cdc.gov/coronavirus/2019-ncov/about/steps-when-sick.html    November 4, 2021  RE:  Tiffanie ZIMMERMAN                                                                                                                  [address] [place] [address]      To whom it may concern:    I evaluated Tiffanie ZIMMERMAN on November 4, 2021. Tiffanie ZIMMERMAN should be excused from work/school.     They should let their workplace manager and staffing office know when their quarantine ends.    We can not give an exact date as it depends on the information below. They can calculate this on their own or work with their manager/staffing office to calculate this. (For example if they were exposed on 10/04, they would have to quarantine for 14 full days. That would be through 10/18. They could return on 10/19.)    Quarantine Guidelines:      If patient receives a positive COVID-19 test result, they should follow the guidance of those who are giving the results. Usually the return to work is 10 (or in some cases 20 days from symptom onset.) If they work at [email or web address], they must be cleared by Employee Occupational Health and Safety to return to work.        If patient receives a negative COVID-19 test result and did not have a high risk exposure to someone with a known positive COVID-19 test, they can return to work once they're free of fever for 24 hours without fever-reducing medication and their symptoms are improving or resolved.      If patient receives a negative COVID-19 test and if they had a high risk exposure to someone who has tested positive for COVID-19 then they can return to work 14 days after their last contact with the positive individual    Note: If there is ongoing exposure to the covid positive person, this quarantine period may be longer than 14 days. (For example, if they are continually exposed to their child, who tested positive and cannot isolate from them, then the quarantine of  7-14 days can't start until their child is no longer contagious. This is typically 10 days from onset to the child's symptoms. So the total duration may be 17-24 days in this case.)     Sincerely,  Silke Alexander PA-C

## 2021-11-13 ENCOUNTER — HEALTH MAINTENANCE LETTER (OUTPATIENT)
Age: 31
End: 2021-11-13

## 2021-11-15 ENCOUNTER — HOSPITAL ENCOUNTER (OUTPATIENT)
Dept: BEHAVIORAL HEALTH | Facility: CLINIC | Age: 31
Discharge: HOME | End: 2021-11-15
Attending: FAMILY MEDICINE | Admitting: FAMILY MEDICINE
Payer: COMMERCIAL

## 2021-11-15 DIAGNOSIS — F33.1 MODERATE EPISODE OF RECURRENT MAJOR DEPRESSIVE DISORDER (H): Primary | ICD-10-CM

## 2021-11-15 PROCEDURE — 90791 PSYCH DIAGNOSTIC EVALUATION: CPT | Mod: GT,95 | Performed by: COUNSELOR

## 2021-11-15 ASSESSMENT — COLUMBIA-SUICIDE SEVERITY RATING SCALE - C-SSRS
1. IN THE PAST MONTH, HAVE YOU WISHED YOU WERE DEAD OR WISHED YOU COULD GO TO SLEEP AND NOT WAKE UP?: NO
TOTAL  NUMBER OF ABORTED OR SELF INTERRUPTED ATTEMPTS PAST LIFETIME: NO
TOTAL  NUMBER OF INTERRUPTED ATTEMPTS LIFETIME: NO
2. HAVE YOU ACTUALLY HAD ANY THOUGHTS OF KILLING YOURSELF?: NO
1. IN THE PAST MONTH, HAVE YOU WISHED YOU WERE DEAD OR WISHED YOU COULD GO TO SLEEP AND NOT WAKE UP?: NO
ATTEMPT PAST THREE MONTHS: NO
6. HAVE YOU EVER DONE ANYTHING, STARTED TO DO ANYTHING, OR PREPARED TO DO ANYTHING TO END YOUR LIFE?: NO
2. HAVE YOU ACTUALLY HAD ANY THOUGHTS OF KILLING YOURSELF LIFETIME?: NO
6. HAVE YOU EVER DONE ANYTHING, STARTED TO DO ANYTHING, OR PREPARED TO DO ANYTHING TO END YOUR LIFE?: NO
5. HAVE YOU STARTED TO WORK OUT OR WORKED OUT THE DETAILS OF HOW TO KILL YOURSELF? DO YOU INTEND TO CARRY OUT THIS PLAN?: NO
4. HAVE YOU HAD THESE THOUGHTS AND HAD SOME INTENTION OF ACTING ON THEM?: NO
4. HAVE YOU HAD THESE THOUGHTS AND HAD SOME INTENTION OF ACTING ON THEM?: NO
5. HAVE YOU STARTED TO WORK OUT OR WORKED OUT THE DETAILS OF HOW TO KILL YOURSELF? DO YOU INTEND TO CARRY OUT THIS PLAN?: NO
TOTAL  NUMBER OF ABORTED OR SELF INTERRUPTED ATTEMPTS PAST 3 MONTHS: NO
3. HAVE YOU BEEN THINKING ABOUT HOW YOU MIGHT KILL YOURSELF?: NO
TOTAL  NUMBER OF INTERRUPTED ATTEMPTS PAST 3 MONTHS: NO
ATTEMPT LIFETIME: NO

## 2021-11-15 ASSESSMENT — PATIENT HEALTH QUESTIONNAIRE - PHQ9
SUM OF ALL RESPONSES TO PHQ QUESTIONS 1-9: 8
10. IF YOU CHECKED OFF ANY PROBLEMS, HOW DIFFICULT HAVE THESE PROBLEMS MADE IT FOR YOU TO DO YOUR WORK, TAKE CARE OF THINGS AT HOME, OR GET ALONG WITH OTHER PEOPLE: SOMEWHAT DIFFICULT
SUM OF ALL RESPONSES TO PHQ QUESTIONS 1-9: 8

## 2021-11-15 ASSESSMENT — ANXIETY QUESTIONNAIRES
4. TROUBLE RELAXING: SEVERAL DAYS
5. BEING SO RESTLESS THAT IT IS HARD TO SIT STILL: SEVERAL DAYS
7. FEELING AFRAID AS IF SOMETHING AWFUL MIGHT HAPPEN: SEVERAL DAYS
2. NOT BEING ABLE TO STOP OR CONTROL WORRYING: SEVERAL DAYS
1. FEELING NERVOUS, ANXIOUS, OR ON EDGE: MORE THAN HALF THE DAYS
6. BECOMING EASILY ANNOYED OR IRRITABLE: MORE THAN HALF THE DAYS
7. FEELING AFRAID AS IF SOMETHING AWFUL MIGHT HAPPEN: SEVERAL DAYS
3. WORRYING TOO MUCH ABOUT DIFFERENT THINGS: MORE THAN HALF THE DAYS
GAD7 TOTAL SCORE: 10
GAD7 TOTAL SCORE: 10
8. IF YOU CHECKED OFF ANY PROBLEMS, HOW DIFFICULT HAVE THESE MADE IT FOR YOU TO DO YOUR WORK, TAKE CARE OF THINGS AT HOME, OR GET ALONG WITH OTHER PEOPLE?: VERY DIFFICULT
GAD7 TOTAL SCORE: 10

## 2021-11-15 NOTE — PROGRESS NOTES
"[facility]  Provider Name:  Raina Lisa     Credentials:  LPCC, LADC    PATIENT'S NAME: Tiffanie ZIMMERMAN  PREFERRED NAME: Tiffanie  PRONOUNS:  She / Her  MRN: [id number]  : 1990  ADDRESS: [address] [place] [address]  ACCT. NUMBER:  [id number]  DATE OF SERVICE: 11/15/21  START TIME: 0800  END TIME: 09  PREFERRED PHONE: [phone]  May we leave a program related message: Yes  EMAIL: [email or web address]  SERVICE MODALITY:  Video Visit:      Provider verified identity through the following two step process.  Patient provided:  Patient  and Patient address    Telemedicine Visit: The patient's condition can be safely assessed and treated via synchronous audio and visual telemedicine encounter.      Reason for Telemedicine Visit: Patient has requested telehealth visit    Originating Site (Patient Location): Patient's home    Distant Site (Provider Location): Provider Remote Setting- Home Office    Consent:  The patient/guardian has verbally consented to: the potential risks and benefits of telemedicine (video visit) versus in person care; bill my insurance or make self-payment for services provided; and responsibility for payment of non-covered services.     Patient would like the video invitation sent by:  My Chart    Mode of Communication:  Video Conference via Better[employer]    As the provider I attest to compliance with applicable laws and regulations related to telemedicine.    UNIVERSAL ADULT Dual-Disorder DIAGNOSTIC ASSESSMENT    Identifying Information:  Patient is a 31 year old . The pronoun use throughout this assessment reflects the patient's chosen pronoun.  Patient was referred for an assessment by primary care provider.  Patient attended the session alone.    Chief Complaint:   The reason for seeking services at this time is: \"Anxiety. Depression and alcohol.\"  The problem(s) began 11/15/21.  Patient reports that " "she has been struggling with symptoms of depression and anxiety since she was 12 years old. She is prescribed an antidepressant by her OB GYN. Patient does not have a previous psychiatric history outside of PCP medication management. Patient reports that she drinks at least one bottle of wine every day, sometimes more. Patient does not have a history of WILLIAM treatment. She reports that she feels like she is in flight or fight mode all the time, it causes significant mental health symptoms such as crying, shaking, outbursts. She is finding it difficult to go to work every day as a result. She is currently in relationship with a male that has introduced her to cocaine, she has been using on the weekends (undefined amount) for the last month on top of her alcohol use. Patient works full time and has her children ages 13, 9 half time. There is no concern for child endangerment at this time.    Patient has attempted to resolve these concerns in the past through medication management.  Patient does not appear to be in severe withdrawal, an imminent safety risk to self or others, or requiring immediate medical attention and may proceed with the assessment interview.    Social/Family History:  Patient reported they grew up in other. They were raised by biological mother.  Parents  / , patient reports that her father lives in [place] and she spent the summers with him. She has a full brother and a half brother.  Patient reported that their childhood as such:  \"I don't know how to say it\". With some prompting patient was able to identify that childhood was good overall. Reports she got along with siblings and mom and step dad.  Patient described their current relationships with family of origin as: Was really good, but this whole last year I haven't hardly seen them but it is because I haven't put any effort in to it. She reports she has no contact with her father, he has gone 10 years without communicating with " "her.  The patient describes their cultural background as .  Cultural influences and impact on patient's life structure, values, norms, and healthcare: Not sure.  Contextual influences on patient's health include: Societal Factors toxic relationship with male.  Patient identified their preferred language to be English. Patient reported they do not need the assistance of an  or other support involved in therapy.  Patient reports they are not involved in community of thierry activities.  They reports spirituality impacts recovery in the following ways: None.     Patient reported had no significant delays in developmental tasks. Patient's highest education level was associate degree / vocational certificate. Patient identified the following learning problems: none reported.  Patient reports they are  able to understand written materials.    Patient reports she was  for seven years and was  in 2017.  Reports that they do get a long well enough and successfully co parent. Patient identified their sexual orientation as heterosexual.  Patient reported having 2 children ages 13 and 7. Patient reports that she has a boyfriend she has been with for seven months, she reports that he does \"a lot of drugs.\" He has recently introduced her to cocaine. Reports that her friends including her boyfriend stay up all weekend long partying. She has voiced an interest in doing something different, but that has not been received well.  She identifies the relationship as toxic.    Patient's current living/housing situation involves staying in own home/apartment.  Patient currently lives with her two children  and they report that housing is stable. Patient identified pets, mom and some of her girlfriends as supportive, but in terms of her girlfriends-they are involved in the weekend long partying.  Patient identified the quality of these relationships as inconsistent.      Patient reports engaging in the " following recreational/leisure activities: likes to be outside, hiking and going to the gym.  Patient is currently employed fulltime.  Patient reports their income is obtained through employment. Patient works as [employer] and works for [employer].  Patient does identify finances as a current stressor.      Patient denies substance related arrests or legal issues.  Patient is not on probation.    Patient's Strengths and Limitations:  Patient identified the following strengths or resources that will help them succeed in treatment: commitment to health and well being, family support, insight, intelligence and motivation. Things that may interfere with the patient's success in treatment include: Motivation and time, sometimes I'm just tired.    Personal and Family Medical History:  Patient does not report a family history of mental health concerns.  Patient reports family history includes Alcoholism in her mother; Depression in her maternal grandmother and mother; No Known Problems in her brother, brother, and father..     Patient reported the following previous diagnoses which include(s): No formal diagnosis.  Patient reported symptoms began as a teenager and included depression, anxiety, patient reports being on an antidepressant since she was 12.  Patient reports symptoms do impact ability to function.  Patient received mental health services in the past: Medication management with PCP. No history of any other mental health services.  Psychiatric Hospitalizations: None. Patient denies a history of civil commitment.  Currently, patient is not receiving other mental health services.  Patient is taking 100 mg of Zoloft prescribed by her PCP.  Patient has had a physical exam to rule out medical causes for current symptoms.  Date of last physical exam was within the past year. Client was encouraged to follow up with PCP if symptoms were to develop. The patient has a [place] Primary Care Provider, who  is named No Dr. Salomon.  Patient reports no current medical concerns. Patient denies any issues with pain..  Patient denies pregnancy.  There are significant appetite / nutritional concerns / weight changes, patient reports she has gained thirty pounds this last year.   Patient does not report a history of head injury / trauma / cognitive impairment.      GAIN-SS Tool:    When was the last time that you had significant problems... 11/15/2021   with feeling very trapped, lonely, sad, blue, depressed or hopeless about the future? Past month   with sleep trouble, such as bad dreams, sleeping restlessly, or falling asleep during the day? Past Month   with feeling very anxious, nervous, tense, scared, panicked or like something bad was going to happen? Past month   with becoming very distressed & upset when something reminded you of the past? Never   with thinking about ending your life or committing suicide? Never     When was the last time that you did the following things 2 or more times? 11/15/2021   Lied or conned to get things you wanted or to avoid having to do something? 1+ years ago   Had a hard time paying attention at school, work or home? 2 to 12 months ago   Had a hard time listening to instructions at school, work or home? 2 to 12 months ago   Were a bully or threatened other people? Never   Started physical fights with other people? Never       Patient reports current meds as:   Outpatient Medications Marked as Taking for the 11/15/21 encounter (Hospital Encounter) with Raina Gonzalez [facility]   Medication Sig     drospirenone-ethinyl estradiol (LORYNA) 3-0.02 MG tablet Take 1 tablet by mouth daily Discard the inactive pills and continue with the new pack on day 22     hydrOXYzine (ATARAX) 25 MG tablet TAKE 1 TABLET(25 MG) BY MOUTH EVERY 4 HOURS AS NEEDED FOR ANXIETY     sertraline (ZOLOFT) 100 MG tablet Take 1.5 tablets (150 mg) by mouth daily     Medication Adherence:  Patient reports taking prescribed  medications as prescribed.    Patient Allergies:  No Known Allergies    Medical History:    Past Medical History:   Diagnosis Date     ASCUS with positive high risk HPV cervical 12/15/2006, 7/3/19    9/30/20     H/O colposcopy with cervical biopsy 01/29/2007    GRETA 1     Current Mental Status Exam:   Appearance:  Appropriate    Eye Contact:  Good   Psychomotor:  Normal       Gait / station:  no problem  Attitude / Demeanor: Cooperative   Speech      Rate / Production: Normal/ Responsive      Volume:  Normal  volume      Language:  intact  Mood:   Normal  Affect:   Appropriate    Thought Content: Clear   Thought Process: Logical       Associations: No loosening of associations  Insight:   Good   Judgment:  Intact   Orientation:  All  Attention/concentration: Good    Rating Scales:    PHQ9:    PHQ-9 SCORE 6/23/2021 8/6/2021 11/15/2021   PHQ-9 Total Score MyChart - - 8 (Mild depression)   PHQ-9 Total Score 6 21 8       GAD7:    JOSÉ MIGUEL-7 SCORE 6/23/2021 8/6/2021 11/15/2021   Total Score - - 10 (moderate anxiety)   Total Score 5 16 10     CGI:     First: Considering your total clinical experience with this particular patient population, how severe are the patient's symptoms at this time?: 4 (11/15/2021  8:18 AM)      Most recent: Compared to the patient's condition at the START of treatment, this patient's condition is: 3 (11/15/2021  8:18 AM)      Substance Use:  Patient reported the following biological family members or relatives with chemical health issues:  Mother: alcohol.  Patient has not received substance use disorder and/or gambling treatment in the past.  Patient has not ever been to detox.  Patient is not currently receiving any chemical dependency treatment. Patient reports no history of support group attendance.      Substance History of use Age of first use Pattern and duration of use (include amounts and frequency) Date of last use     Withdrawal potential Route of administration   Alcohol currently use   13  One bottle of wine per night. Can drink up to two in a night. 11/14/21 No oral   Cannabis   used in the past 12 Infrequent use in a form of a gummy 10 mg. 08/01/21 No oral     Amphetamines   never used          Cocaine/crack    used in the past 31  Reports weekend use since dating her boyfriend, has used up to ten times. Amounts unknown 11/13/21  Yes snorted   Hallucinogens never used            Inhalants never used          Heroin never used          Other Opiates never used        Benzodiazepine   never used        Barbiturates never used         Over the counter meds never used         Nicotine  Never used         other substances not listed above:  Identify:  never used              Patient reported the following problems as a result of their substance use:financial problems; occupational/vocational problems.  Patient is concerned about substance use. Patient reports her mom is concerned about their substance use.  Patient reports their recovery goals are to be able to socially drink. Partying ever weekend with boyfriend and friends, all night long.    Patient reports experiencing the following withdrawal symptoms within the past 12 months: none and the following within the past 30 days: none.   Patients reports urges to use Alcohol.  Patient reports she has used more Alcohol than intended and over a longer period of time than intended. Patient reports she has not had unsuccessful attempts to cut down or control use of Alcohol.  Patient reports longest period of abstinence was during pregnancy and return to use was due to not being pregnant. Patient reports she has needed to use more Alcohol to achieve the same effect. Patient does report diminished effect with use of same amount of Alcohol.     Patient does  report a great deal of time is spent in activities necessary to obtain, use, or recover from Alcohol effects.  Patient does  report important social, occupational, or recreational activities are given up or  "reduced because of Alcohol use.  Alcohol use is continued despite knowledge of having a persistent or recurrent physical or psychological problem that is likely to have caused or exacerbated by use.  Patient reports the following problem behaviors while under the influence of substances: sending mean text message.     Patient reports substance use has not ever impacted their ability to function in a school setting. Patient reports substance use has ever impacted their ability to function in a work setting.  Patients demographics and history impact their recovery in the following ways: \"boyfriend uses   a lot of drugs.\"  Patient reports engaging in the following recreation/leisure activities while using: None.  Patient reports the following people are supportive of recovery: Mom    Patient does not have a history of gambling concerns and/or treatment.  Patient does not have other addictive behaviors she is concerned about.      Dimension Scale Ratings:    Dimension 1 -  Acute Intoxication/Withdrawal: 0 - No Problem: Patient denies any history of withdrawal symptoms. Reports she was recently on call from Thursday to Monday and did not drink or have any symptoms of withdrawal.  Dimension 2 - Biomedical: 0 - No Problem: Patient denies any biomedical conditions that would possible interfere with functioning and/or substance use treatment.  Dimension 3 - Emotional/Behavioral/Cognitive Conditions: 2 - Moderate Problem: Patient endorses symptoms of depression and anxiety, she does feel that they are impacting her ability to function at her highest level.  Dimension 4 - Readiness to Change:  1 - Minor Problem: Patient would like to be able to drink socially. She is in a toxic relationship with a male that encourages substance use.  Dimension 5 - Relapse/Continued Use/ Continued Problem Potential: 2 - Moderate Problem: Patients chances of maintaining sobriety is limited especially if she continues in her relationship. She is " aware of this.  Dimension 6 - Recovery Environment:  2 - Moderate Problem: Patient does not appear to have a strong sober support system. She does work full time and has been able to maintain sobriety around working hours.    Significant Losses / Trauma / Abuse / Neglect Issues:   Patient did not serve in the .  There are indications or report of significant loss, trauma, abuse or neglect issues related to: are no indications and client denies any losses, trauma, abuse, or neglect concerns.  Concerns for possible neglect are not present.      Safety Assessment:   Current Safety Concerns:  [place] Suicide Severity Rating Scale (Lifetime/Recent)  [place] Suicide Severity Rating (Lifetime/Recent) 11/15/2021   1. Wish to be Dead (Lifetime) No   1. Wish to be Dead (Recent) No   2. Non-Specific Active Suicidal Thoughts (Lifetime) No   2. Non-Specific Active Suicidal Thoughts (Recent) No   3. Active Suicidal Ideation with any Methods (Not Plan) Without Intent to Act (Lifetime) No   3. Active Suicidal Ideation with any Methods (Not Plan) Without Intent to Act (Recent) No   4. Active Suicidal Ideation with Some Intent to Act, Without Specific Plan (Lifetime) No   4. Active Suicidal Ideation with Some Intent to Act, Without Specific Plan (Recent) No   5. Active Suicidal Ideation with Specific Plan and Intent (Lifetime) No   5. Active Suicidal Ideation with Specific Plan and Intent (Recent) No   Most Severe Ideation Rating (Lifetime) NA   Frequency (Lifetime) NA   Duration (Lifetime) NA   Controllability (Lifetime) NA   Protective Factors  (Lifetime) NA   Reasons for Ideation (Lifetime) NA   Most Severe Ideation Rating (Past Month) NA   Most Severe Ideation Description (Past Month)     Frequency (Past Month) NA   Duration (Past Month) NA   Controllability (Past Month) NA   Protective Factors (Past Month) NA   Reasons for Ideation (Past Month) NA   Actual Attempt (Lifetime) No   Actual Attempt (Past 3 Months) No   Has  subject engaged in non-suicidal self-injurious behavior? (Lifetime) No   Has subject engaged in non-suicidal self-injurious behavior? (Past 3 Months) No   Interrupted Attempts (Lifetime) No   Interrupted Attempts (Past 3 Months) No   Aborted or Self-Interrupted Attempt (Lifetime) No   Aborted or Self-Interrupted Attempt (Past 3 Months) No   Preparatory Acts or Behavior (Lifetime) No   Preparatory Acts or Behavior (Past 3 Months) No     Patient denies current homicidal ideation and behaviors.  Patient denies current self-injurious ideation and behaviors.    Patient denied risk behaviors associated with substance use.  Patient denies any high risk behaviors associated with mental health symptoms.  Patient reports the following current concerns for their personal safety: None.  Patient reports there are not firearms in the house.         History of Safety Concerns:  Patient denied a history of homicidal ideation.     Patient denied a history of personal safety concerns.    Patient denied a history of assaultive behaviors.    Patient denied a history of sexual assault behaviors.     Patient denied a history of risk behaviors associated with substance use.  Patient denies any history of high risk behaviors associated with mental health symptoms.  Patient reports the following protective factors: forward or future oriented thinking; dedication to family or friends; regular physical activity    Risk Plan:  See Recommendations for Safety and Risk Management Plan    Review of Symptoms per patient report:  Depression: Change in sleep, Lack of interest, Change in energy level, Difficulties concentrating, Change in appetite, Low self-worth, Ruminations, Irritability, Feeling sad, down, or depressed, Withdrawn, Frequent crying and Anger outbursts. Patient endorses symptoms of depression since around the age of 12 when she was started on medication.  SI/SIB/ SA:  Denies suicidal ideation, denies plan, denies intent, able to  contract for safety. Patient does not have a history of suicide attempts, suicidal ideation or self injurious behaviors.  Ellei:  No Symptoms  Psychosis: No Symptoms  Anxiety: Excessive worry, Nervousness, Physical complaints, such as headaches, stomachaches, muscle tension, Social anxiety, Fears/phobias Public speaking., Ruminations and Irritability. Patient also noticed symptoms when she was a teenager.  Panic:  Palpitations, Tremors, Shortness of breath, Tingling, Numbness, Sense of impending doom and Triggers usually has to do with an arguement.  Post Traumatic Stress Disorder:  No Symptoms   Eating Disorder: No Symptoms  ADD / ADHD:  Difficulties listening, Poor task completion, Poor organizational skills, Distractibility, Forgetful and Hyperactive. Patient reports that she has been encouraged to obtain ADHD testing, but has put this off.  Conduct Disorder: No symptoms  Autism Spectrum Disorder: No symptoms  Obsessive Compulsive Disorder: No Symptoms  Substance Use:  hangovers, daily use, substance related decrease in work performance, social problems related to substance use and cravings/urges to use. Patient has been drinking a bottle of wine (sometimes two) daily for several years.    Patient reports the following compulsive behaviors and treatment history: None.      Diagnostic Criteria:   Major Depressive Disorder-  A) Recurrent episode(s) - symptoms have been present during the same 2-week period and represent a change from previous functioning 5 or more symptoms (required for diagnosis)   - Depressed mood. Note: In children and adolescents, can be irritable mood.     - Diminished interest or pleasure in all, or almost all, activities.    - Significant weight gain.   - Fatigue or loss of energy.    - Feelings of worthlessness or inappropriate and excessive guilt.    - Diminished ability to think or concentrate, or indecisiveness.    - Recurrent thoughts of death (not just fear of dying), recurrent  suicidal ideation without a specific plan, or a suicide attempt or a specific plan for committing suicide.   B) The symptoms cause clinically significant distress or impairment in social, occupational, or other important areas of functioning  C) The episode is not attributable to the physiological effects of a substance or to another medical condition  D) The occurrence of major depressive episode is not better explained by other thought / psychotic disorders  E) There has never been a manic episode or hypomanic episode    Alcohol Use Disorder-Severe  Substance is often taken in larger amounts or over a longer period than was intended.  Met for: Alcohol. There is persistent desire or unsuccessful efforts to cut down or control use of the substance.  Met for:  Alcohol.  A great deal of time is spent in activities necessary to obtain the substance, use the substance, or recover from its effects.  Met for:  Alcohol. Craving, or a strong desire or urge to use the substance.  Met for:  Alcohol.  Recurrent use of the substance resulting in a failure to fulfill major role obligations at work, school, or home.  Met for:  Alcohol. Continued use of the substance despite having persistent or recurrent social or interpersonal problems caused or exacerbated by the effects of its use.  Met for:  Alcohol.Important social, occupational, or recreational activities are given up or reduced because of the substance.  Met for:  Alcohol. Recurrent use of the substance in which it is physically hazardous.  Met for:  Alcohol. Use of the substance is continued despite knowledge of having a persistent or recurrent physical or psychological problem that is likely to have been cause or exacerbated by the substance.  Met for:  Alcohol.Tolerance:  either a need for markedly increased amounts of the substance to achieve the desired effect or a markedly diminished effect with continued use of the dame amount of the substance.  Met for:   Alcohol.    Functional Status:  Patient reports the following functional impairments: health maintenance, relationship(s), self-care and social interactions.     WHODAS:   WHODAS 2.0 Total Score 11/15/2021   Total Score 21   Total Score MyChart 21     Clinical Summary:  1. Reason for assessment: Depression, anxiety and alcohol use.  2. Psychosocial, Cultural and Contextual Factors: In toxic relationship with partner that encourages substance use  3. Principal DSM5 Diagnoses  (Sustained by DSM5 Criteria Listed Above):   296.30 (F33.9) Major Depressive Disorder, Recurrent Episode, Unspecified _ and With mixed features  Substance-Related & Addictive Disorders 291.9 (F10.99) Unspecified Alcohol Related Disorder.  4. Other Diagnoses that is relevant to services:   300.00 (F41.9) Unspecified Anxiety Disorder.  5. Provisional Diagnosis:     6. Prognosis: Expect Improvement and Relieve Acute Symptoms.  7. Likely consequences of symptoms if not treated: Increased depression, continued problematic alcohol use, increased cocaine use.  8. Client strengths include:  educated, empathetic, employed, good listener, insightful, intelligent, motivated and responsible parent .     Brief Clinical Summary: Patient is a 31-year  female who is seeking services to assist with symptoms of depression, anxiety and alcohol use. Patient has been experiencing symptoms of depression since she was 12, at that time she was placed on an antidepressant with noticeable improvement. She does not have a formal MH diagnosis, no history of hospitalizations or therapy. Patient likely will require professional help with her alcohol use and managing her use, she drinks a bottle of wine per day and sometimes more. Patient has no history of WILLIAM treatment, she has never been to treatment and denies any history of complicated withdrawals. She reports she was on call from Thursday-Tuesday and did not experience any symptoms of withdrawal. At this time,  patient is declining a need for WILLIAM treatment due to family and work obligations. She is open to individual therapy and working with addiction medicine, she is aware that this assessment is good for roughly 30 days and if she decides that she does want to participate in WILLIAM treatment to contact this provider and a referral can be made. Patient denies a history of suicidal ideation, suicide attempts or self injurious behaviors.     The patient's acute suicide risk was determined to be low due to the following factors: Denial of suicidal thoughts, no history of suicide attempts. Patient is not currently under the influence of alcohol or illicit substances, denies experiencing command hallucinations, and has no immediate access to firearms. The patient's acute risk could be higher if noncompliant with their treatment plan, medications, follow-up appointments or continued use of illicit substances and alcohol use.  Protective factors include: Family, children, employment, and patient remains future orientated and is actively looking for help.     Recommendations:    1. Plan for Safety and Risk Management:   Recommended that patient call 911 or go to the local ED should there be a change in any of these risk factors. Report to child / adult protection services was NA.     2. Patient's identified relationship issues..     3. Initial Treatment will focus on:    Depressed Mood - impaired functioning, increased in lack of motivation including going to work.  Anxiety - Panic attacks.  Alcohol / Substance Use - Alcohol use / Cocaine use.     4. Resources/Service Plan:    services are not indicated.   Modifications to assist communication are not indicated.   Additional disability accommodations are not indicated.      5. Collaboration:   Collaboration / coordination of treatment will be initiated with the following support professionals: None.      6.  Referrals:   The following referral(s) will be initiated:  [facility]patient [facility]  Addiction Medicine. Next Scheduled Appointment: [facility], order placed 11/15/2021. Patient wanted to stay within the [facility]     A Release of Information has been obtained for the following: None.     7. WILLIAM:    Recommendations:  Outpatient WILLIAM Treatment.  Patient declined this recommendation due to work and family committments.  Patient would like the following family or other support people involved in their treatment:  Not applicable.. Patient does not have a history of opiate use.    8. Records:   These were reviewed at time of assessment.   Information in this assessment was obtained from the medical record and provided by patient who is a good historian.  Patient will have open access to their mental health medical record.      Provider Name/ Credentials:  ANGI Means, REGIC  November 15, 2021

## 2021-11-16 ASSESSMENT — ANXIETY QUESTIONNAIRES: GAD7 TOTAL SCORE: 10

## 2021-11-16 ASSESSMENT — PATIENT HEALTH QUESTIONNAIRE - PHQ9: SUM OF ALL RESPONSES TO PHQ QUESTIONS 1-9: 8

## 2021-11-22 ENCOUNTER — PATIENT OUTREACH (OUTPATIENT)
Dept: OBGYN | Facility: CLINIC | Age: 31
End: 2021-11-22
Payer: COMMERCIAL

## 2021-11-22 DIAGNOSIS — N87.0 MILD DYSPLASIA OF CERVIX (CIN I): ICD-10-CM

## 2021-12-28 DIAGNOSIS — F41.1 GENERALIZED ANXIETY DISORDER: ICD-10-CM

## 2021-12-28 NOTE — TELEPHONE ENCOUNTER
"Requested Prescriptions   Pending Prescriptions Disp Refills     sertraline (ZOLOFT) 100 MG tablet 45 tablet 1     Sig: Take 1.5 tablets (150 mg) by mouth daily       SSRIs Protocol Failed - 12/28/2021 11:30 AM        Failed - Recent (12 mo) or future (30 days) visit within the authorizing provider's specialty     Patient has had an office visit with the authorizing provider or a provider within the authorizing providers department within the previous 12 mos or has a future within next 30 days. See \"Patient Info\" tab in inbasket, or \"Choose Columns\" in Meds & Orders section of the refill encounter.              Passed - Medication is active on med list        Passed - Patient is age 18 or older        Passed - No active pregnancy on record        Passed - No positive pregnancy test in last 12 months           Pt last seen 12/8/2020 for colposcopy    Last prescribed 8/6/2021 for 45 tablets with 1 refill.    Pt overdue for annual exam, RN sent mychart reminder to schedule.    Yamileth Wagner RN on 12/28/2021 at 11:33 AM    "

## 2021-12-29 NOTE — TELEPHONE ENCOUNTER
Pt has not yet read the mychart reminder to schedule.  Will check again tomorrow and if she still hasn't read it we will contact pt by phone.    Jemma Watters RN

## 2021-12-30 NOTE — TELEPHONE ENCOUNTER
Unable to reach patient via phone. RN left a message and instructed patient to call the clinic at [phone].    Yamileth Wagner RN on 12/30/2021 at 9:06 AM

## 2021-12-31 NOTE — TELEPHONE ENCOUNTER
Pt has not read [email or web address] message.    Unable to reach patient via phone.  Left message to call clinic back at [phone].    ILIANA NoblesN RN

## 2022-01-03 NOTE — TELEPHONE ENCOUNTER
Unable to reach pt x3, pt has not read or responded to mychart an no appt scheduled. RN routing to provider to advise on refill.  .Yamileth Wagner RN on 1/3/2022 at 4:36 PM

## 2022-01-04 RX ORDER — SERTRALINE HYDROCHLORIDE 100 MG/1
150 TABLET, FILM COATED ORAL DAILY
Qty: 45 TABLET | Refills: 0 | Status: SHIPPED | OUTPATIENT
Start: 2022-01-04 | End: 2022-02-08

## 2022-02-01 DIAGNOSIS — F41.1 GENERALIZED ANXIETY DISORDER: ICD-10-CM

## 2022-02-01 RX ORDER — SERTRALINE HYDROCHLORIDE 100 MG/1
150 TABLET, FILM COATED ORAL DAILY
Qty: 45 TABLET | Refills: 0 | Status: CANCELLED | OUTPATIENT
Start: 2022-02-01

## 2022-02-01 NOTE — TELEPHONE ENCOUNTER
Mychart message has not been read.  Attempted to reach pt to see if she has enough Zoloft to get her through to her appt.  Unable to reach patient via phone. Left message to call clinic back at [phone].    Jemma Watters RN

## 2022-02-01 NOTE — TELEPHONE ENCOUNTER
"Requested Prescriptions   Pending Prescriptions Disp Refills     sertraline (ZOLOFT) 100 MG tablet 45 tablet 0     Sig: Take 1.5 tablets (150 mg) by mouth daily       SSRIs Protocol Passed - 2/1/2022 10:41 AM        Passed - Recent (12 mo) or future (30 days) visit within the authorizing provider's specialty     Patient has had an office visit with the authorizing provider or a provider within the authorizing providers department within the previous 12 mos or has a future within next 30 days. See \"Patient Info\" tab in inbasket, or \"Choose Columns\" in Meds & Orders section of the refill encounter.              Passed - Medication is active on med list        Passed - Patient is age 18 or older        Passed - No active pregnancy on record        Passed - No positive pregnancy test in last 12 months           Pt last seen 12/8/2020 for colp.    Last prescribed 1/4/2022 for 45 tablets with 0 refills    Pt has appt on 2/4/2022, RN sent message to see if pt is in need of refill prior to appt.    Yamileth Wagner RN on 2/1/2022 at 10:44 AM    "

## 2022-02-01 NOTE — TELEPHONE ENCOUNTER
Date last filled 1/4/22  Quantity 45    Has appointment scheduled 2/4/22 for Physical    KATHI Escobar 2/1/2022

## 2022-02-02 NOTE — TELEPHONE ENCOUNTER
[facility] message has still not been read.  Attempted to reach pt again but went straight to .  Unable to reach patient via phone. Left message to call clinic back at [phone].    Pt's appt is in 2 days.    Jemma Watters RN

## 2022-02-03 NOTE — TELEPHONE ENCOUNTER
Unable to reach pt and pt has not read her [employer] message.  She does have an appt tomorrow so she can address the refill at that time.    Jemma Watters RN

## 2022-02-08 DIAGNOSIS — F41.1 GENERALIZED ANXIETY DISORDER: ICD-10-CM

## 2022-02-08 RX ORDER — SERTRALINE HYDROCHLORIDE 100 MG/1
150 TABLET, FILM COATED ORAL DAILY
Qty: 30 TABLET | Refills: 0 | Status: SHIPPED | OUTPATIENT
Start: 2022-02-08 | End: 2022-08-15

## 2022-02-08 NOTE — TELEPHONE ENCOUNTER
"Requested Prescriptions   Pending Prescriptions Disp Refills     sertraline (ZOLOFT) 100 MG tablet 45 tablet 0     Sig: Take 1.5 tablets (150 mg) by mouth daily       SSRIs Protocol Passed - 2/8/2022 11:49 AM        Passed - Recent (12 mo) or future (30 days) visit within the authorizing provider's specialty     Patient has had an office visit with the authorizing provider or a provider within the authorizing providers department within the previous 12 mos or has a future within next 30 days. See \"Patient Info\" tab in inbasket, or \"Choose Columns\" in Meds & Orders section of the refill encounter.              Passed - Medication is active on med list        Passed - Patient is age 18 or older        Passed - No active pregnancy on record        Passed - No positive pregnancy test in last 12 months           Pt last seen 12/8/2020 for colp.     Last prescribed 1/4/2022 for 45 tablets with 0 refills.    Pt had appt scheduled on 2/4/2022 but cancelled, now scheduled on 3/8/2022.    RN routing to provider regarding additional francisca refill being pt has already received one.    Yamileth Wagner RN on 2/8/2022 at 11:53 AM       "

## 2022-02-08 NOTE — TELEPHONE ENCOUNTER
Please let patient know I refilled the prescription for her however she needs to be seen for an annual exam.    Nereyda Farr, DO

## 2022-02-08 NOTE — TELEPHONE ENCOUNTER
Date last filled 1/4/22  Quantity 45    Cancelled 2/4/22 appt,Rescheduled to 3/8/22 for Annual.    KATHI Escobar 2/8/2022

## 2022-03-03 DIAGNOSIS — F41.1 GENERALIZED ANXIETY DISORDER: ICD-10-CM

## 2022-03-03 RX ORDER — SERTRALINE HYDROCHLORIDE 100 MG/1
150 TABLET, FILM COATED ORAL DAILY
Qty: 30 TABLET | Refills: 0 | Status: CANCELLED | OUTPATIENT
Start: 2022-03-03

## 2022-03-04 RX ORDER — SERTRALINE HYDROCHLORIDE 100 MG/1
TABLET, FILM COATED ORAL
Qty: 60 TABLET | Refills: 0 | Status: SHIPPED | OUTPATIENT
Start: 2022-03-04 | End: 2022-04-12

## 2022-03-04 NOTE — TELEPHONE ENCOUNTER
"Pending Prescriptions:                       Disp   Refills    sertraline (ZOLOFT) 100 MG tablet          30 tab*0        Sig: Take 1.5 tablets (150 mg) by mouth daily    Routing refill request to provider for review/approval because:  Requested Prescriptions   Pending Prescriptions Disp Refills    sertraline (ZOLOFT) 100 MG tablet 30 tablet 0     Sig: Take 1.5 tablets (150 mg) by mouth daily        SSRIs Protocol Failed - 3/3/2022 12:01 PM        Failed - Recent (12 mo) or future (30 days) visit within the authorizing provider's specialty     Patient has had an office visit with the authorizing provider or a provider within the authorizing providers department within the previous 12 mos or has a future within next 30 days. See \"Patient Info\" tab in inbasket, or \"Choose Columns\" in Meds & Orders section of the refill encounter.              Passed - Medication is active on med list        Passed - Patient is age 18 or older        Passed - No active pregnancy on record        Passed - No positive pregnancy test in last 12 months            sertraline (ZOLOFT) 100 MG tablet 30 tablet 0 2/8/2022  No   Sig - Route: Take 1.5 tablets (150 mg) by mouth daily - Oral   Sent to pharmacy as: Sertraline HCl 100 MG Oral Tablet (ZOLOFT)   Class: E-Prescribe   Order: [id number]   E-Prescribing Status: Receipt confirmed by pharmacy (2/8/2022 12:54 PM CST)     Ines Dutta RN on 3/4/2022 at 12:06 PM        "

## 2022-04-01 NOTE — TELEPHONE ENCOUNTER
FYI to provider - Patient is lost to pap tracking follow-up. Attempts to contact pt have been made per reminder process and there has been no reply and/or no appt scheduled. Contact hx listed below.     12/15/06 ASCUS pap, + HR HPV (not 16 or 18). in Care Everywhere  1/29/07 Boley GRETA 1  1/21/08 NIL pap  12/10/08 NIL pap  5/12/15 NIL pap  5/27/16 NIL pap  8/9/17 NIL pap. Plan: Cotest in 3 years, as patient will be > 29 yo.  7/3/19 ASCUS pap, + HR HPV (not 16 or 18). Plan colp.   9/30/20 ASCUS pap, + HR HPV (not 16 or 18). Plan colp due by 12/30/20 12/8/20 Boley Bx - GRETA 1, ECC - no GRETA. Cotest due in 1 year.   12/12/20 Pt viewed result on 6APT.  11/22/21 Reminder [facility]  12/17/21 Reminder call-LM  2/4/22 Appt -- cancelled  3/8/22 Appt -- no show  4/1/22 Lost to follow-up for pap tracking     Keisha Riggs RN BSN, Pap Tracking

## 2022-04-04 NOTE — TELEPHONE ENCOUNTER
Attempts for patient follow up is noted.    She is considered lost to follow up and the pap smear tracking episode is closed.    Should she return, then a new tracking episode will be opened.

## 2022-04-12 DIAGNOSIS — F41.1 GENERALIZED ANXIETY DISORDER: ICD-10-CM

## 2022-04-12 RX ORDER — SERTRALINE HYDROCHLORIDE 100 MG/1
TABLET, FILM COATED ORAL
Qty: 60 TABLET | Refills: 0 | Status: SHIPPED | OUTPATIENT
Start: 2022-04-12 | End: 2023-01-26

## 2022-04-12 NOTE — TELEPHONE ENCOUNTER
"Requested Prescriptions   Pending Prescriptions Disp Refills     sertraline (ZOLOFT) 100 MG tablet [Pharmacy Med Name: SERTRALINE 100MG TABLETS] 60 tablet 0     Sig: TAKE 1 AND 1/2 TABLETS(150 MG) BY MOUTH DAILY       SSRIs Protocol Failed - 4/12/2022  4:02 AM        Failed - Recent (12 mo) or future (30 days) visit within the authorizing provider's specialty     Patient has had an office visit with the authorizing provider or a provider within the authorizing providers department within the previous 12 mos or has a future within next 30 days. See \"Patient Info\" tab in inbasket, or \"Choose Columns\" in Meds & Orders section of the refill encounter.              Passed - Medication is active on med list        Passed - Patient is age 18 or older        Passed - No active pregnancy on record        Passed - No positive pregnancy test in last 12 months           Per last encounter, pt needs to be seen, has not been seen since 12/8/2020.    Pt has appt with FP on 4/15. RN routing to provider to close encounter.    Yamileth Wagner RN on 4/12/2022 at 8:52 AM    "

## 2022-07-18 ENCOUNTER — E-VISIT (OUTPATIENT)
Dept: URGENT CARE | Facility: CLINIC | Age: 32
End: 2022-07-18
Payer: COMMERCIAL

## 2022-07-18 DIAGNOSIS — N39.0 ACUTE UTI (URINARY TRACT INFECTION): Primary | ICD-10-CM

## 2022-07-18 PROCEDURE — 99421 OL DIG E/M SVC 5-10 MIN: CPT | Performed by: FAMILY MEDICINE

## 2022-07-18 RX ORDER — SULFAMETHOXAZOLE/TRIMETHOPRIM 800-160 MG
1 TABLET ORAL 2 TIMES DAILY
Qty: 6 TABLET | Refills: 0 | Status: SHIPPED | OUTPATIENT
Start: 2022-07-18 | End: 2022-07-21

## 2022-07-18 NOTE — PATIENT INSTRUCTIONS
Thank you for choosing us for your care. I have placed an order for a prescription so that you can start treatment. View your full visit summary for details by clicking on the link below. Your pharmacist will able to address any questions you may have about the medication.     If you re not feeling better within 2-3 days, please schedule an appointment.  You can schedule an appointment right here in [employer], or call [phone]  If the visit is for the same symptoms as your eVisit, we ll refund the cost of your eVisit if seen within seven days.      Urinary Tract Infections in Women  Urinary tract infections (UTIs) are most often caused by bacteria. These bacteria enter the urinary tract. The bacteria may come from inside the body. Or they may travel from the skin outside the rectum or vagina into the urethra. Female anatomy makes it easy for bacteria from the bowel to enter a woman s urinary tract, which is the most common source of UTI. This means women develop UTIs more often than men. Pain in or around the urinary tract is a common UTI symptom. But the only way to know for sure if you have a UTI for the healthcare provider to test your urine. The two tests that may be done are the urinalysis and urine culture.     Types of UTIs    Cystitis. A bladder infection (cystitis) is the most common UTI in women. You may have urgent or frequent need to pee. You may also have pain, burning when you pee, and bloody urine.    Urethritis. This is an inflamed urethra, which is the tube that carries urine from the bladder to outside the body. You may have lower stomach or back pain. You may also have urgent or frequent need to pee.    Pyelonephritis. This is a kidney infection. If not treated, it can be serious and damage your kidneys. In severe cases, you may need to stay in the hospital. You may have a fever and lower back pain.    Medicines to treat a UTI  Most UTIs are treated with antibiotics. These kill the bacteria.  The length of time you need to take them depends on the type of infection. It may be as short as 3 days. If you have repeated UTIs, you may need a low-dose antibiotic for several months. Take antibiotics exactly as directed. Don t stop taking them until all of the medicine is gone. If you stop taking the antibiotic too soon, the infection may not go away. You may also develop a resistance to the antibiotic. This can make it much harder to treat.   Lifestyle changes to treat and prevent UTIs   The lifestyle changes below will help get rid of your UTI. They may also help prevent future UTIs.     Drink plenty of fluids. This includes water, juice, or other caffeine-free drinks. Fluids help flush bacteria out of your body.    Empty your bladder. Always empty your bladder when you feel the urge to pee. And always pee before going to sleep. Urine that stays in your bladder can lead to infection. Try to pee before and after sex as well.    Practice good personal hygiene. Wipe yourself from front to back after using the toilet. This helps keep bacteria from getting into the urethra.    Use condoms during sex. These help prevent UTIs caused by sexually transmitted bacteria. Also don't use spermicides during sex. These can increase the risk for UTIs. Choose other forms of birth control instead. For women who tend to get UTIs after sex, a low-dose of a preventive antibiotic may be used. Be sure to discuss this option with your healthcare provider.    Follow up with your healthcare provider as directed. He or she may test to make sure the infection has cleared. If needed, more treatment may be started.  [employer] last reviewed this educational content on 7/1/2019 2000-2021 [employer]. All rights reserved. This information is not intended as a substitute for professional medical care. Always follow your healthcare professional's instructions.

## 2022-08-14 DIAGNOSIS — F41.1 GENERALIZED ANXIETY DISORDER: ICD-10-CM

## 2022-08-15 RX ORDER — SERTRALINE HYDROCHLORIDE 100 MG/1
TABLET, FILM COATED ORAL
Qty: 45 TABLET | Refills: 1 | OUTPATIENT
Start: 2022-08-15

## 2022-08-15 NOTE — TELEPHONE ENCOUNTER
Patient should have ran out of medication in May. Overdue for visit, not established with a PCP.     Refused. Mychart sent.     Mireya Galaviz, RN, BSN, PHN  [facility]: [place]

## 2022-11-20 ENCOUNTER — HEALTH MAINTENANCE LETTER (OUTPATIENT)
Age: 32
End: 2022-11-20

## 2022-12-08 ENCOUNTER — MYC MEDICAL ADVICE (OUTPATIENT)
Dept: FAMILY MEDICINE | Facility: CLINIC | Age: 32
End: 2022-12-08

## 2022-12-08 ENCOUNTER — TELEPHONE (OUTPATIENT)
Dept: FAMILY MEDICINE | Facility: CLINIC | Age: 32
End: 2022-12-08

## 2022-12-08 NOTE — TELEPHONE ENCOUNTER
Patient Quality Outreach    Patient is due for the following:   Depression  -  PHQ-9 needed    Next Steps:   Patient was scheduled for Yearly Depression Screening    Type of outreach:    Sent Dash message.      Questions for provider review:    None     Maeve Zarate CMA  Chart routed to Care Team.

## 2022-12-08 NOTE — LETTER
[facility] [place]  [address]  [place] [address]  [phone]      December 15, 2022    To  Tiffanie ZIMMERMAN  [facility] [place] [address]    Your team at [facility] cares about your health. We have reviewed your chart and based on our findings; we are making the following recommendations to better manage your health.     You are in particular need of attention regarding the following:     Schedule an office visit with your PRIMARY CARE PHYSICIAN for mental health follow-up.  Complete the attached questionnaires to ensure your mental health needs are being properly met.  Please fill them out and send back to us via [employer], and feel free to call us with any questions or concerns at [phone].      If you have already completed these items, please contact the clinic via phone or   [employer] so your care team can review and update your records. Thank you for   choosing [facility] for your healthcare needs. For any questions,   concerns, or to schedule an appointment please contact our clinic.    Healthy Regards,      Your [facility] Care Team

## 2022-12-15 NOTE — TELEPHONE ENCOUNTER
Patient Quality Outreach    Patient is due for the following:   Depression  -  PHQ-9 needed    Next Steps:   Patient was scheduled for Yearly Depression Screening    Type of outreach:    Sent [employer] message.    Next Steps:  Reach out within 90 days via Letter.    Max number of attempts reached: Yes. Will try again in 90 days if patient still on fail list.    Questions for provider review:    None    Maeve Zarate [facility]  Chart routed to Care Team.

## 2023-01-01 ENCOUNTER — NURSE TRIAGE (OUTPATIENT)
Dept: NURSING | Facility: CLINIC | Age: 33
End: 2023-01-01

## 2023-01-01 ASSESSMENT — PATIENT HEALTH QUESTIONNAIRE - PHQ9
10. IF YOU CHECKED OFF ANY PROBLEMS, HOW DIFFICULT HAVE THESE PROBLEMS MADE IT FOR YOU TO DO YOUR WORK, TAKE CARE OF THINGS AT HOME, OR GET ALONG WITH OTHER PEOPLE: VERY DIFFICULT
SUM OF ALL RESPONSES TO PHQ QUESTIONS 1-9: 23
SUM OF ALL RESPONSES TO PHQ QUESTIONS 1-9: 23

## 2023-01-01 NOTE — TELEPHONE ENCOUNTER
Call from patient who reports she has been struggling with her mental health for long time. She says she drinks too much. She thinks she could be bipolar.    She was previously treated for depression and anxiety since she was 12. She weaned herself of Zoloft for the past year and restarted her medication last month. She last took Zoloft yesterday. She is at her mom's today so did not take any Zoloft.    She denies SI/HI. She denies previous suicide attempts.    Patient reports her symptoms are interferring w/ normal activities.    Assessment/Disposition: go to ED.  Patient will go to [facility] ED - she is interested in the [facility] for evaluation.    Reviewed care advice with caller per RN triage protocol guideline. Advised to call back with worsening symptoms, concerns or questions. Caller verbalized understanding.      Jerri Oliva, RN, BSN  Triage Nurse Advisor            Reason for Disposition    [1] Depression AND [2] unable to do any of normal activities (e.g., self care, school, work; in comparison to baseline).    Additional Information    Negative: Patient attempted suicide    Negative: Patient is threatening suicide now    Negative: Violent behavior, or threatening to physically hurt or kill someone    Negative: [1] Patient is very confused (disoriented, slurred speech) AND [2] no other adult (e.g., friend or family member) available    Negative: [1] Difficult to awaken or acting very confused (disoriented, slurred speech) AND [2] new-onset    Negative: Sounds like a life-threatening emergency to the triager    Protocols used: DEPRESSION-A-

## 2023-01-02 NOTE — TELEPHONE ENCOUNTER
See nurse triage encounter from 01/01/2023. Pt was triaged for SI/HI. Pt is currently scheduled for an appointment on 01/06/2023.    Pau Lyons RN

## 2023-01-06 ENCOUNTER — HOSPITAL ENCOUNTER (OUTPATIENT)
Dept: BEHAVIORAL HEALTH | Facility: CLINIC | Age: 33
Discharge: HOME | End: 2023-01-06
Attending: FAMILY MEDICINE
Payer: COMMERCIAL

## 2023-01-06 PROCEDURE — 999N000216 HC STATISTIC ADULT CD FACE TO FACE-NO CHRG: Mod: GT | Performed by: COUNSELOR

## 2023-01-06 ASSESSMENT — ANXIETY QUESTIONNAIRES
IF YOU CHECKED OFF ANY PROBLEMS ON THIS QUESTIONNAIRE, HOW DIFFICULT HAVE THESE PROBLEMS MADE IT FOR YOU TO DO YOUR WORK, TAKE CARE OF THINGS AT HOME, OR GET ALONG WITH OTHER PEOPLE: SOMEWHAT DIFFICULT
2. NOT BEING ABLE TO STOP OR CONTROL WORRYING: MORE THAN HALF THE DAYS
7. FEELING AFRAID AS IF SOMETHING AWFUL MIGHT HAPPEN: MORE THAN HALF THE DAYS
5. BEING SO RESTLESS THAT IT IS HARD TO SIT STILL: NOT AT ALL
7. FEELING AFRAID AS IF SOMETHING AWFUL MIGHT HAPPEN: MORE THAN HALF THE DAYS
4. TROUBLE RELAXING: MORE THAN HALF THE DAYS
GAD7 TOTAL SCORE: 12
6. BECOMING EASILY ANNOYED OR IRRITABLE: SEVERAL DAYS
3. WORRYING TOO MUCH ABOUT DIFFERENT THINGS: MORE THAN HALF THE DAYS
GAD7 TOTAL SCORE: 12
1. FEELING NERVOUS, ANXIOUS, OR ON EDGE: NEARLY EVERY DAY
GAD7 TOTAL SCORE: 12
8. IF YOU CHECKED OFF ANY PROBLEMS, HOW DIFFICULT HAVE THESE MADE IT FOR YOU TO DO YOUR WORK, TAKE CARE OF THINGS AT HOME, OR GET ALONG WITH OTHER PEOPLE?: SOMEWHAT DIFFICULT

## 2023-01-06 ASSESSMENT — COLUMBIA-SUICIDE SEVERITY RATING SCALE - C-SSRS
6. HAVE YOU EVER DONE ANYTHING, STARTED TO DO ANYTHING, OR PREPARED TO DO ANYTHING TO END YOUR LIFE?: NO
4. HAVE YOU HAD THESE THOUGHTS AND HAD SOME INTENTION OF ACTING ON THEM?: NO
5. HAVE YOU STARTED TO WORK OUT OR WORKED OUT THE DETAILS OF HOW TO KILL YOURSELF? DO YOU INTEND TO CARRY OUT THIS PLAN?: NO
1. IN THE PAST MONTH, HAVE YOU WISHED YOU WERE DEAD OR WISHED YOU COULD GO TO SLEEP AND NOT WAKE UP?: NO
2. HAVE YOU ACTUALLY HAD ANY THOUGHTS OF KILLING YOURSELF IN THE PAST MONTH?: NO
3. HAVE YOU BEEN THINKING ABOUT HOW YOU MIGHT KILL YOURSELF?: NO

## 2023-01-06 ASSESSMENT — PATIENT HEALTH QUESTIONNAIRE - PHQ9
SUM OF ALL RESPONSES TO PHQ QUESTIONS 1-9: 18
10. IF YOU CHECKED OFF ANY PROBLEMS, HOW DIFFICULT HAVE THESE PROBLEMS MADE IT FOR YOU TO DO YOUR WORK, TAKE CARE OF THINGS AT HOME, OR GET ALONG WITH OTHER PEOPLE: VERY DIFFICULT
SUM OF ALL RESPONSES TO PHQ QUESTIONS 1-9: 18

## 2023-01-06 NOTE — PATIENT INSTRUCTIONS
Date: Wednesday, 1/11/2023  Time: 2:00 pm - 3:00 pm  Provider: Johnathan Rosas MA  Location: [facility], [address] [place], [place] [id number]  Phone: [phone]  Type: Teletherapy    To reduce no shows, we ask that patients call our office at [phone] and confirm their appointment and leave their email. We make effort to reach each client, but if we cannot reach them or they do not confirm appointment, the appointment will be removed. Please ask patients to leave a voicemail with their information.    Date: Friday, 2/17/2023  Time: 9:10 am - 10:25 am  Provider: Lorenza Eckert  MS  CNP,PMHNP,RN  Location: [facility], [address] [address]  Phone: [phone]  Type: Telepsychiatry      Patient Instructions  Bijal is a 'telepsychiatry' provider, so she will not physically be in the clinic. Patients will attend the appointment via VIDEO ONLY.

## 2023-01-06 NOTE — PROGRESS NOTES
"[facility]    ADULT Mental Health Assessment Appointment Note    Patient name:  Tiffanie ZIMMERMAN    Preferred Pronoun:   MRN: [id number]  YOB: 1990  Address:  [address] [place] [address] [address] [place] [address]  Preferred phone: [phone]   May we leave a referral related message: No    Date of service: 23  Start time: 11:00am   End time: 11:26am   Service modality:  Video Visit:      Provider verified identity through the following two step process.  Patient provided:  Patient  and Patient address    Telemedicine Visit: The patient's condition can be safely assessed and treated via synchronous audio and visual telemedicine encounter.      Reason for Telemedicine Visit: Services only offered telehealth    Originating Site (Patient Location): Patient's home    Distant Site (Provider Location): [facility]    Consent:  The patient/guardian has verbally consented to: the potential risks and benefits of telemedicine (video visit) versus in person care; bill my insurance or make self-payment for services provided; and responsibility for payment of non-covered services.     Patient would like the video invitation sent by:  My Chart    Mode of Communication:  Video Conference via [employer]    Distant Location (Provider):  Off-site    As the provider I attest to compliance with applicable laws and regulations related to telemedicine.    Identifying Information:  Patient is a 32 year old,  Patient was referred for an assessment by self.  Patient attended the session alone. Patient identified their preferred language to be English. Patient reported they does not need the assistance of an  or other support involved in therapy.     Services Requested:   The reason for seeking services at this time is: \" Alcohol, Depression and anxiety \"  Patient has attempted to resolve these concerns in the past.  Patient does " not have legal concerns.    Mental Health:  Review of Symptoms per patient report:  Depression: Change in sleep, Lack of interest, Excessive or inappropriate guilt, Change in energy level, Difficulties concentrating, Change in appetite, Psychomotor slowing or agitation, Feelings of hopelessness, Feelings of helplessness, Low self-worth, Ruminations, Irritability, Feeling sad, down, or depressed and Withdrawn  Ellie: No Symptoms  Psychosis: No Symptoms  Anxiety: Excessive worry, Nervousness, Sleep disturbance, Ruminations, Poor concentration and Irritability  Panic: No symptoms  Post Traumatic Stress Disorder: No Symptoms  Eating Disorder: No Symptoms  ADD / ADHD: No symptoms  Conduct Disorder: No symptoms  Autism Spectrum Disorder: No symptoms  Obsessive Compulsive Disorder: No Symptoms    Substance Use:  Do you  have a history of alcohol or illicit drug use? Alcohol use             Current Mental Status Exam:   Appearance:  Appropriate    Eye Contact:  Good   Psychomotor:  Normal   Attitude / Demeanor: Cooperative   Speech Rate:  Normal/ Responsive  Volume:  Normal  volume  Language:  no problems  Mood:   Normal  Affect:   Appropriate    Thought Content: Clear   Thought Process: Coherent     Associations:  No loosening of associations  Insight:   Good   Judgment:  Intact   Orientation:  All  Attention:  Good    Rating Scales:  PHQ9:    PHQ-9 SCORE 11/15/2021 1/1/2023 1/6/2023   PHQ-9 Total Score MyChart 8 (Mild depression) 23 (Severe depression) 18 (Moderately severe depression)   PHQ-9 Total Score 8 23 18   ;    GAD7:    JOSÉ MIGUEL-7 SCORE 8/6/2021 11/15/2021 1/6/2023   Total Score - 10 (moderate anxiety) 12 (moderate anxiety)   Total Score 16 10 12     [place] Suicide Severity Rating Scale (Lifetime/Recent)  [place] Suicide Severity Rating (Lifetime/Recent) 11/15/2021 1/6/2023   Wish to be Dead (Lifetime) No -   Non-Specific Active Suicidal Thoughts (Lifetime) No -   Most Severe Ideation Rating (Lifetime) NA -    Frequency (Lifetime) NA -   Duration (Lifetime) NA -   Controllability (Lifetime) NA -   Protective Factors  (Lifetime) NA -   Reasons for Ideation (Lifetime) NA -   Q1 Wished to be Dead (Past Month) - no   Q2 Suicidal Thoughts (Past Month) - no   Q3 Suicidal Thought Method - no   Q4 Suicidal Intent without Specific Plan - no   Q5 Suicide Intent with Specific Plan - no   Q6 Suicide Behavior (Lifetime) - no   Level of Risk per Screen - low risk   RETIRED: 1. Wish to be Dead (Recent) No -   RETIRED: 2. Non-Specific Active Suicidal Thoughts (Recent) No -   3. Active Suicidal Ideation with any Methods (Not Plan) Without Intent to Act (Lifetime) No -   RETIRED: 3. Active Suicidal Ideation with any Methods (Not Plan) Without Intent to Act (Recent) No -   RETIRE: 4. Active Suicidal Ideation with Some Intent to Act, Without Specific Plan (Lifetime) No -   4. Active Suicidal Ideation with Some Intent to Act, Without Specific Plan (Recent) No -   RETIRE: 5. Active Suicidal Ideation with Specific Plan and Intent (Lifetime) No -   RETIRED: 5. Active Suicidal Ideation with Specific Plan and Intent (Recent) No -   Most Severe Ideation Rating (Past Month) NA -   Most Severe Ideation Description (Past Month)   -   Frequency (Past Month) NA -   Duration (Past Month) NA -   Controllability (Past Month) NA -   Protective Factors (Past Month) NA -   Reasons for Ideation (Past Month) NA -   Actual Attempt (Lifetime) No -   Actual Attempt (Past 3 Months) No -   Has subject engaged in non-suicidal self-injurious behavior? (Lifetime) No -   Has subject engaged in non-suicidal self-injurious behavior? (Past 3 Months) No -   Interrupted Attempts (Lifetime) No -   Interrupted Attempts (Past 3 Months) No -   Aborted or Self-Interrupted Attempt (Lifetime) No -   Aborted or Self-Interrupted Attempt (Past 3 Months) No -   Preparatory Acts or Behavior (Lifetime) No -   Preparatory Acts or Behavior (Past 3 Months) No -       Safety Assessment:    Current Safety Concerns: No safety concerns at this time.     Clinical Impressions:  Major Depressive Disorder  A) Recurrent episode(s) - symptoms have been present during the same 2-week period and represent a change from previous functioning 5 or more symptoms (required for diagnosis)   - Depressed mood. Note: In children and adolescents, can be irritable mood.     - Diminished interest or pleasure in all, or almost all, activities.    - Psychomotor activity agitation.    - Fatigue or loss of energy.    - Feelings of worthlessness or inappropriate and excessive guilt.    - Diminished ability to think or concentrate, or indecisiveness.    - Recurrent thoughts of death (not just fear of dying), recurrent suicidal ideation without a specific plan, or a suicide attempt or a specific plan for committing suicide.   B) The symptoms cause clinically significant distress or impairment in social, occupational, or other important areas of functioning  C) The episode is not attributable to the physiological effects of a substance or to another medical condition  D) The occurence of major depressive episode is not better explained by other thought / psychotic disorders  E) There has never been a manic episode or hypomanic episode      Clinical Substantiation:  Summary of Visit: Patient is a 32 year old male with a history of  Depression who presents for an assessment of mental health needs. Patient has concerns with current alcohol use. The patient's acute suicide risk was determined to be low due to the following factors: Denies suicidal thoughts and denies hx of suicide attempts.The patient's acute risk could be higher if noncompliant with their treatment plan, medications, follow-up appointments or using illicit substances or alcohol. Clinician explained DA process and programmatic care. Pt states that she works from about 9am until sometimes the next morning. Pt states that at this time she needs help with finding a  therapist and medication provider.     Therapeutic Interventions Provided: supportive active listening and explored alternatives    Recommendations/Plan: therapy and medication management    Referral:     Date: Wednesday, 1/11/2023  Time: 2:00 pm - 3:00 pm  Provider: Johnathan Rosas MA  Location: [facility], [address] [place] [address] [place] [address]  Phone: [phone]  Type: Teletherapy    To reduce no shows, we ask that patients call our office at [phone] and confirm their appointment and leave their email. We make effort to reach each client, but if we cannot reach them or they do not confirm appointment, the appointment will be removed. Please ask patients to leave a voicemail with their information.    Date: Friday, 2/17/2023  Time: 9:10 am - 10:25 am  Provider: Lorenza Eckert  MS  CNP,PMHNP,RN  Location: [facility], [address] [place] [address], [address], [place] [address]  Phone: [phone]  Type: Telepsychiatry      Patient Instructions  Bijal is a 'telepsychiatry' provider, so she will not physically be in the clinic. Patients will attend the appointment via VIDEO ONLY.        Guera Murdock, [facility],  January 6, 2023  [facility]

## 2023-01-26 ENCOUNTER — MYC REFILL (OUTPATIENT)
Dept: OBGYN | Facility: CLINIC | Age: 33
End: 2023-01-26

## 2023-01-26 DIAGNOSIS — F41.1 GENERALIZED ANXIETY DISORDER: ICD-10-CM

## 2023-01-26 NOTE — TELEPHONE ENCOUNTER
"Requested Prescriptions   Pending Prescriptions Disp Refills     sertraline (ZOLOFT) 100 MG tablet 60 tablet 0     Sig: TAKE 1 AND 1/2 TABLETS(150 MG) BY MOUTH DAILY       SSRIs Protocol Failed - 1/26/2023 11:53 AM        Failed - Recent (12 mo) or future (30 days) visit within the authorizing provider's specialty     Patient has had an office visit with the authorizing provider or a provider within the authorizing providers department within the previous 12 mos or has a future within next 30 days. See \"Patient Info\" tab in inbasket, or \"Choose Columns\" in Meds & Orders section of the refill encounter.              Passed - Medication is active on med list        Passed - Patient is age 18 or older        Passed - No active pregnancy on record        Passed - No positive pregnancy test in last 12 months             Pt last seen 12/8/2020    Last prescribed 4/12/2022 for 60 tablets with 0 refills    Pt has appt with psychiatrist on 2/22/2023- asking for short refill until that appt.    Routing refill request to provider for review/approval because:  A break in medication  Patient needs to be seen because it has been more than 1 year since last office visit.    Yamileth Wagner RN on 1/26/2023 at 11:56 AM          "

## 2023-01-30 RX ORDER — SERTRALINE HYDROCHLORIDE 100 MG/1
TABLET, FILM COATED ORAL
Qty: 60 TABLET | Refills: 0 | Status: SHIPPED | OUTPATIENT
Start: 2023-01-30 | End: 2023-05-22

## 2023-05-19 ENCOUNTER — MYC REFILL (OUTPATIENT)
Dept: OBGYN | Facility: CLINIC | Age: 33
End: 2023-05-19

## 2023-05-19 DIAGNOSIS — F41.1 GENERALIZED ANXIETY DISORDER: ICD-10-CM

## 2023-05-19 RX ORDER — SERTRALINE HYDROCHLORIDE 100 MG/1
TABLET, FILM COATED ORAL
Qty: 60 TABLET | Refills: 0 | Status: CANCELLED | OUTPATIENT
Start: 2023-05-19

## 2023-05-19 NOTE — TELEPHONE ENCOUNTER
"Requested Prescriptions   Pending Prescriptions Disp Refills     sertraline (ZOLOFT) 100 MG tablet 60 tablet 0     Sig: TAKE 1 AND 1/2 TABLETS(150 MG) BY MOUTH DAILY       SSRIs Protocol Failed - 5/19/2023  1:49 PM        Failed - Recent (12 mo) or future (30 days) visit within the authorizing provider's specialty     Patient has had an office visit with the authorizing provider or a provider within the authorizing providers department within the previous 12 mos or has a future within next 30 days. See \"Patient Info\" tab in inbasket, or \"Choose Columns\" in Meds & Orders section of the refill encounter.              Passed - Medication is active on med list        Passed - Patient is age 18 or older        Passed - No active pregnancy on record        Passed - No positive pregnancy test in last 12 months             Pt last seen 12/8/2020 for colposcopy    Last prescribed 1/30/2023 for 60 tablets with 0 refills.    RN sent another reminder to schedule annual.    Yamileth Wagner RN on 5/19/2023 at 1:58 PM    "

## 2023-05-22 ENCOUNTER — MYC REFILL (OUTPATIENT)
Dept: OBGYN | Facility: CLINIC | Age: 33
End: 2023-05-22

## 2023-05-22 DIAGNOSIS — F41.1 GENERALIZED ANXIETY DISORDER: ICD-10-CM

## 2023-05-22 NOTE — TELEPHONE ENCOUNTER
"Requested Prescriptions   Pending Prescriptions Disp Refills     sertraline (ZOLOFT) 100 MG tablet 60 tablet 0     Sig: TAKE 1 AND 1/2 TABLETS(150 MG) BY MOUTH DAILY       SSRIs Protocol Failed - 5/22/2023 10:38 AM        Failed - Recent (12 mo) or future (30 days) visit within the authorizing provider's specialty     Patient has had an office visit with the authorizing provider or a provider within the authorizing providers department within the previous 12 mos or has a future within next 30 days. See \"Patient Info\" tab in inbasket, or \"Choose Columns\" in Meds & Orders section of the refill encounter.              Passed - Medication is active on med list        Passed - Patient is age 18 or older        Passed - No active pregnancy on record        Passed - No positive pregnancy test in last 12 months           Pt is scheduled with an FP provider on 5/26 to take over prescribing Zoloft for pt.  However, she states she is out now and would like a small refill to get her to her appt.    Routing to Dr. Salomon to approve a francisca refill to get pt to that appt.    Jemma Watters RN        "

## 2023-05-23 RX ORDER — SERTRALINE HYDROCHLORIDE 100 MG/1
TABLET, FILM COATED ORAL
Qty: 45 TABLET | Refills: 0 | Status: SHIPPED | OUTPATIENT
Start: 2023-05-23 | End: 2023-05-26

## 2023-05-24 NOTE — TELEPHONE ENCOUNTER
Closing encounter as Dr. Salomon sent in an rx for Zoloft in a separate encounter.    Jemma Watters RN

## 2023-05-26 ENCOUNTER — VIRTUAL VISIT (OUTPATIENT)
Dept: FAMILY MEDICINE | Facility: CLINIC | Age: 33
End: 2023-05-26

## 2023-05-26 DIAGNOSIS — F41.1 GENERALIZED ANXIETY DISORDER: ICD-10-CM

## 2023-05-26 DIAGNOSIS — Z72.0 TOBACCO ABUSE: Primary | ICD-10-CM

## 2023-05-26 PROCEDURE — 99214 OFFICE O/P EST MOD 30 MIN: CPT | Mod: VID | Performed by: PHYSICIAN ASSISTANT

## 2023-05-26 PROCEDURE — 96127 BRIEF EMOTIONAL/BEHAV ASSMT: CPT | Performed by: PHYSICIAN ASSISTANT

## 2023-05-26 RX ORDER — SERTRALINE HYDROCHLORIDE 100 MG/1
100 TABLET, FILM COATED ORAL DAILY
Qty: 90 TABLET | Refills: 0 | Status: SHIPPED | OUTPATIENT
Start: 2023-05-26 | End: 2023-08-22

## 2023-05-26 ASSESSMENT — ANXIETY QUESTIONNAIRES
7. FEELING AFRAID AS IF SOMETHING AWFUL MIGHT HAPPEN: SEVERAL DAYS
GAD7 TOTAL SCORE: 10
4. TROUBLE RELAXING: SEVERAL DAYS
8. IF YOU CHECKED OFF ANY PROBLEMS, HOW DIFFICULT HAVE THESE MADE IT FOR YOU TO DO YOUR WORK, TAKE CARE OF THINGS AT HOME, OR GET ALONG WITH OTHER PEOPLE?: VERY DIFFICULT
5. BEING SO RESTLESS THAT IT IS HARD TO SIT STILL: NEARLY EVERY DAY
6. BECOMING EASILY ANNOYED OR IRRITABLE: SEVERAL DAYS
7. FEELING AFRAID AS IF SOMETHING AWFUL MIGHT HAPPEN: SEVERAL DAYS
1. FEELING NERVOUS, ANXIOUS, OR ON EDGE: MORE THAN HALF THE DAYS
GAD7 TOTAL SCORE: 10
IF YOU CHECKED OFF ANY PROBLEMS ON THIS QUESTIONNAIRE, HOW DIFFICULT HAVE THESE PROBLEMS MADE IT FOR YOU TO DO YOUR WORK, TAKE CARE OF THINGS AT HOME, OR GET ALONG WITH OTHER PEOPLE: VERY DIFFICULT
3. WORRYING TOO MUCH ABOUT DIFFERENT THINGS: SEVERAL DAYS
2. NOT BEING ABLE TO STOP OR CONTROL WORRYING: SEVERAL DAYS

## 2023-05-26 ASSESSMENT — PATIENT HEALTH QUESTIONNAIRE - PHQ9
SUM OF ALL RESPONSES TO PHQ QUESTIONS 1-9: 7
SUM OF ALL RESPONSES TO PHQ QUESTIONS 1-9: 7
10. IF YOU CHECKED OFF ANY PROBLEMS, HOW DIFFICULT HAVE THESE PROBLEMS MADE IT FOR YOU TO DO YOUR WORK, TAKE CARE OF THINGS AT HOME, OR GET ALONG WITH OTHER PEOPLE: SOMEWHAT DIFFICULT

## 2023-05-26 NOTE — PROGRESS NOTES
Tiffanie is a 33 year old who is being evaluated via a billable video visit.      How would you like to obtain your AVS? MyChart  If the video visit is dropped, the invitation should be resent by: Text to cell phone: [phone]  Will anyone else be joining your video visit? No          Assessment & Plan     Generalized anxiety disorder  Will refill. She was having withdrawal symptoms due to not being able to fill. Feeling better now. She has not had an in person visit in close to 2 years - suggest she be seen within the next 3 months to establish care and have a physical exam.   - sertraline (ZOLOFT) 100 MG tablet; Take 1 tablet (100 mg) by mouth daily    Tobacco abuse  Has been using pouches - using 2 mg dose but having issues decreasing. Interested in trying nicorette - can try splitting in half to help wean as well.   - nicotine (NICORETTE) 2 MG gum; Place 1 each (2 mg) inside cheek every hour as needed for smoking cessation                 Elsa Naranjo PA-C  [facility]   Tiffanie is a 33 year old, presenting for the following health issues:  Depression, Anxiety, and Smoking Cessation         View : No data to display.              History of Present Illness       Mental Health Follow-up:  Patient presents to follow-up on Depression & Anxiety.Patient's depression since last visit has been:  Worse  The patient is not having other symptoms associated with depression.  Patient's anxiety since last visit has been:  Worse  The patient is not having other symptoms associated with anxiety.  Any significant life events: No  Patient is feeling anxious or having panic attacks.  Patient has concerns about alcohol or drug use.    She eats 2-3 servings of fruits and vegetables daily.She consumes 0 sweetened beverage(s) daily.She exercises with enough effort to increase her heart rate 20 to 29 minutes per day.  She exercises with enough effort to increase her heart rate 4 days per week.    She is taking medications regularly.    Today's PHQ-9         PHQ-9 Total Score: 7    PHQ-9 Q9 Thoughts of better off dead/self-harm past 2 weeks :   Not at all    How difficult have these problems made it for you to do your work, take care of things at home, or get along with other people: Somewhat difficult  Today's JOSÉ MIGUEL-7 Score: 10       Depression and Anxiety Follow-Up    How are you doing with your depression since your last visit? Worsened     How are you doing with your anxiety since your last visit?  Worsened     Are you having other symptoms that might be associated with depression or anxiety? No    Have you had a significant life event? No     Do you have any concerns with your use of alcohol or other drugs? No    Prescription went out. Was uncomfortably anxious.   Did get a refill 2 days ago (was out for a few days).      Did try weaning off last year - didn't go well.     Was drinking EtOH - had really decreased alcohol use this year. Previously had used daily, now very rare. But drank again due to withdrawal symptoms.     Social History     Tobacco Use     Smoking status: Never     Smokeless tobacco: Never   Substance Use Topics     Alcohol use: Yes     Comment: 1 bottle of wine     Drug use: Yes         1/1/2023     1:59 PM 1/6/2023    11:02 AM 5/26/2023     9:54 AM   PHQ   PHQ-9 Total Score 23 18 7   Q9: Thoughts of better off dead/self-harm past 2 weeks Several days Not at all Not at all   F/U: Thoughts of suicide or self-harm No     F/U: Safety concerns No           11/15/2021     6:32 AM 1/6/2023    10:59 AM 5/26/2023     9:52 AM   JOSÉ MIGUEL-7 SCORE   Total Score 10 (moderate anxiety) 12 (moderate anxiety) 10 (moderate anxiety)   Total Score 10 12 10         Suicide Assessment Five-step Evaluation and Treatment (SAFE-T)          Review of Systems   Constitutional, HEENT, cardiovascular, pulmonary, gi and gu systems are negative, except as otherwise noted.      Objective           Vitals:  No vitals were  obtained today due to virtual visit.    Physical Exam   GENERAL: Healthy, alert and no distress  EYES: Eyes grossly normal to inspection.  No discharge or erythema, or obvious scleral/conjunctival abnormalities.  RESP: No audible wheeze, cough, or visible cyanosis.  No visible retractions or increased work of breathing.    SKIN: Visible skin clear. No significant rash, abnormal pigmentation or lesions.  NEURO: Cranial nerves grossly intact.  Mentation and speech appropriate for age.  PSYCH: Mentation appears normal, affect normal/bright, judgement and insight intact, normal speech and appearance well-groomed.                Video-Visit Details    Type of service:  Video Visit     Originating Location (pt. Location): Home    Distant Location (provider location):  On-site  Platform used for Video Visit: [employer]

## 2023-08-22 DIAGNOSIS — F41.1 GENERALIZED ANXIETY DISORDER: ICD-10-CM

## 2023-08-22 RX ORDER — SERTRALINE HYDROCHLORIDE 100 MG/1
100 TABLET, FILM COATED ORAL DAILY
Qty: 90 TABLET | Refills: 0 | Status: SHIPPED | OUTPATIENT
Start: 2023-08-22 | End: 2023-12-18

## 2023-11-25 ENCOUNTER — HEALTH MAINTENANCE LETTER (OUTPATIENT)
Age: 33
End: 2023-11-25

## 2023-12-16 DIAGNOSIS — F41.1 GENERALIZED ANXIETY DISORDER: ICD-10-CM

## 2023-12-18 RX ORDER — SERTRALINE HYDROCHLORIDE 100 MG/1
100 TABLET, FILM COATED ORAL DAILY
Qty: 90 TABLET | Refills: 0 | Status: SHIPPED | OUTPATIENT
Start: 2023-12-18 | End: 2024-04-01

## 2024-04-01 DIAGNOSIS — F41.1 GENERALIZED ANXIETY DISORDER: ICD-10-CM

## 2024-04-01 RX ORDER — SERTRALINE HYDROCHLORIDE 100 MG/1
100 TABLET, FILM COATED ORAL DAILY
Qty: 90 TABLET | Refills: 0 | Status: SHIPPED | OUTPATIENT
Start: 2024-04-01 | End: 2024-07-15

## 2024-04-22 NOTE — LETTER
[address] [address] [phone]                                                                                                August 15, 2017    Tiffanie ZIMMERMAN  [address] [place] [address]        Dear Ms. ZIMMERMAN,    The results of your recent lab tests were within normal limits. Enclosed is a copy of these results.  If you have any further questions or problems, please contact our office.    Results for orders placed or performed in visit on 08/09/17   Pap imaged thin layer screen reflex to HPV if ASCUS - recommend age 25 - 29   Result Value Ref Range    PAP NIL     Copath Report         Patient Name: TIFFANIE ZIMMERMAN  MR#: [id number]  Specimen #: [id number]  Collected: 8/9/2017  Received: 8/10/2017  Reported: 8/14/2017 09:47  Ordering Phy(s): MERCEDES SAGASTUME    For improved result formatting, select 'View Enhanced Report Format'  under Linked Documents section.    SPECIMEN/STAIN PROCESS:  Pap imaged thin layer prep screening (Surepath, FocalPoint with guided  screening)       Pap-Cyto x 1, Pap with reflex to HPV if ASCUS x 1    SOURCE: Cervical, endocervical  ----------------------------------------------------------------   Pap imaged thin layer prep screening (Surepath, FocalPoint with guided  screening)  SPECIMEN ADEQUACY:  Satisfactory for evaluation.  -Transformation zone component absent.    CYTOLOGIC INTERPRETATION:    Negative for intraepithelial lesion or malignancy    Electronically signed out by:  BOB Barreto (ASCP)    Processed and screened at [facility],  [facility]    CLINICAL HISTORY:    Pap anicolaou Test Limitations:  Cervical cytology is a screening test  with limited sensitivity; regular screening is critical for cancer  prevention; Pap tests are primarily effective for the  diagnosis/prevention of squamous cell carcinoma, not adenocarcinomas  or  other cancers.    TESTING LAB LOCATION:  [facility], [address] [place] [address]  [phone]    COLLECTION SITE:  Client:  [facility], [place]  Location: [facility] (B)     HIV Antigen Antibody Combo   Result Value Ref Range    HIV Antigen Antibody Combo  NR     Nonreactive   HIV-1 p24 Ag & HIV-1/HIV-2 Ab Not Detected     Anti Treponema   Result Value Ref Range    Treponema pallidum Antibody Negative NEG   Chlamydia trachomatis PCR   Result Value Ref Range    Specimen Description Cervix     Chlamydia Trachomatis PCR  NEG     Negative   Negative for C. trachomatis rRNA by transcription mediated amplification.   A negative result by transcription mediated amplification does not preclude the   presence of C. trachomatis infection because results are dependent on proper   and adequate collection, absence of inhibitors, and sufficient rRNA to be   detected.     Neisseria gonorrhoeae PCR   Result Value Ref Range    Specimen Descrip Cervix     N Gonorrhea PCR  NEG     Negative   Negative for N. gonorrhoeae rRNA by transcription mediated amplification.   A negative result by transcription mediated amplification does not preclude the   presence of N. gonorrhoeae infection because results are dependent on proper   and adequate collection, absence of inhibitors, and sufficient rRNA to be   detected.           Sincerely,      Marj Galvan DO/kay   Normal for race

## 2024-06-11 NOTE — TELEPHONE ENCOUNTER
Patient needs to be seen.  I have never seen this patient.  Patient was seen by Dr. Salomon in 2020 for an annual exam.  30 days of pills given however I gave her #30 last month as well.        Nereyda Farr, DO     RX refill request from the patient/pharmacy. Patient last seen 06- with labs, and next appt. scheduled for 07-  Requested Prescriptions     Pending Prescriptions Disp Refills    simvastatin (ZOCOR) 20 MG tablet [Pharmacy Med Name: SIMVASTATIN 20 MG TABLET] 90 tablet 3     Sig: TAKE 1 TABLET BY MOUTH EVERY DAY    .

## 2024-07-15 ENCOUNTER — OFFICE VISIT (OUTPATIENT)
Dept: FAMILY MEDICINE | Facility: CLINIC | Age: 34
End: 2024-07-15
Payer: COMMERCIAL

## 2024-07-15 VITALS
OXYGEN SATURATION: 97 % | BODY MASS INDEX: 28.35 KG/M2 | RESPIRATION RATE: 16 BRPM | TEMPERATURE: 98 F | SYSTOLIC BLOOD PRESSURE: 125 MMHG | HEART RATE: 78 BPM | WEIGHT: 198 LBS | HEIGHT: 70 IN | DIASTOLIC BLOOD PRESSURE: 86 MMHG

## 2024-07-15 DIAGNOSIS — R41.840 CONCENTRATION DEFICIT: ICD-10-CM

## 2024-07-15 DIAGNOSIS — R10.11 RUQ ABDOMINAL PAIN: ICD-10-CM

## 2024-07-15 DIAGNOSIS — Z12.4 CERVICAL CANCER SCREENING: ICD-10-CM

## 2024-07-15 DIAGNOSIS — Z11.59 NEED FOR HEPATITIS C SCREENING TEST: ICD-10-CM

## 2024-07-15 DIAGNOSIS — F41.1 GENERALIZED ANXIETY DISORDER: ICD-10-CM

## 2024-07-15 DIAGNOSIS — Z13.6 SCREENING FOR HEART DISEASE: ICD-10-CM

## 2024-07-15 DIAGNOSIS — Z00.00 ROUTINE GENERAL MEDICAL EXAMINATION AT A HEALTH CARE FACILITY: Primary | ICD-10-CM

## 2024-07-15 LAB
BASOPHILS # BLD AUTO: 0.1 10E3/UL (ref 0–0.2)
BASOPHILS NFR BLD AUTO: 1 %
EOSINOPHIL # BLD AUTO: 0.6 10E3/UL (ref 0–0.7)
EOSINOPHIL NFR BLD AUTO: 8 %
ERYTHROCYTE [DISTWIDTH] IN BLOOD BY AUTOMATED COUNT: 11.7 % (ref 10–15)
HCT VFR BLD AUTO: 41.4 % (ref 35–47)
HGB BLD-MCNC: 14.3 G/DL (ref 11.7–15.7)
IMM GRANULOCYTES # BLD: 0 10E3/UL
IMM GRANULOCYTES NFR BLD: 0 %
LYMPHOCYTES # BLD AUTO: 2 10E3/UL (ref 0.8–5.3)
LYMPHOCYTES NFR BLD AUTO: 26 %
MCH RBC QN AUTO: 31.8 PG (ref 26.5–33)
MCHC RBC AUTO-ENTMCNC: 34.5 G/DL (ref 31.5–36.5)
MCV RBC AUTO: 92 FL (ref 78–100)
MONOCYTES # BLD AUTO: 0.5 10E3/UL (ref 0–1.3)
MONOCYTES NFR BLD AUTO: 7 %
NEUTROPHILS # BLD AUTO: 4.5 10E3/UL (ref 1.6–8.3)
NEUTROPHILS NFR BLD AUTO: 58 %
PLATELET # BLD AUTO: 376 10E3/UL (ref 150–450)
RBC # BLD AUTO: 4.5 10E6/UL (ref 3.8–5.2)
WBC # BLD AUTO: 7.7 10E3/UL (ref 4–11)

## 2024-07-15 PROCEDURE — 80053 COMPREHEN METABOLIC PANEL: CPT

## 2024-07-15 PROCEDURE — 86364 TISS TRNSGLTMNASE EA IG CLAS: CPT

## 2024-07-15 PROCEDURE — 99214 OFFICE O/P EST MOD 30 MIN: CPT | Mod: 25

## 2024-07-15 PROCEDURE — 99395 PREV VISIT EST AGE 18-39: CPT | Mod: 25

## 2024-07-15 PROCEDURE — 90471 IMMUNIZATION ADMIN: CPT

## 2024-07-15 PROCEDURE — 85025 COMPLETE CBC W/AUTO DIFF WBC: CPT

## 2024-07-15 PROCEDURE — 82150 ASSAY OF AMYLASE: CPT

## 2024-07-15 PROCEDURE — 82306 VITAMIN D 25 HYDROXY: CPT

## 2024-07-15 PROCEDURE — 80061 LIPID PANEL: CPT

## 2024-07-15 PROCEDURE — 84443 ASSAY THYROID STIM HORMONE: CPT

## 2024-07-15 PROCEDURE — 86803 HEPATITIS C AB TEST: CPT

## 2024-07-15 PROCEDURE — 36415 COLL VENOUS BLD VENIPUNCTURE: CPT

## 2024-07-15 PROCEDURE — 90715 TDAP VACCINE 7 YRS/> IM: CPT

## 2024-07-15 RX ORDER — SERTRALINE HYDROCHLORIDE 100 MG/1
100 TABLET, FILM COATED ORAL DAILY
Qty: 90 TABLET | Refills: 1 | Status: SHIPPED | OUTPATIENT
Start: 2024-07-15

## 2024-07-15 RX ORDER — HYDROXYZINE HYDROCHLORIDE 25 MG/1
25-50 TABLET, FILM COATED ORAL EVERY 8 HOURS PRN
Qty: 30 TABLET | Refills: 1 | Status: SHIPPED | OUTPATIENT
Start: 2024-07-15

## 2024-07-15 SDOH — HEALTH STABILITY: PHYSICAL HEALTH: ON AVERAGE, HOW MANY DAYS PER WEEK DO YOU ENGAGE IN MODERATE TO STRENUOUS EXERCISE (LIKE A BRISK WALK)?: 5 DAYS

## 2024-07-15 ASSESSMENT — PATIENT HEALTH QUESTIONNAIRE - PHQ9
SUM OF ALL RESPONSES TO PHQ QUESTIONS 1-9: 12
SUM OF ALL RESPONSES TO PHQ QUESTIONS 1-9: 12
10. IF YOU CHECKED OFF ANY PROBLEMS, HOW DIFFICULT HAVE THESE PROBLEMS MADE IT FOR YOU TO DO YOUR WORK, TAKE CARE OF THINGS AT HOME, OR GET ALONG WITH OTHER PEOPLE: SOMEWHAT DIFFICULT

## 2024-07-15 ASSESSMENT — SOCIAL DETERMINANTS OF HEALTH (SDOH): HOW OFTEN DO YOU GET TOGETHER WITH FRIENDS OR RELATIVES?: ONCE A WEEK

## 2024-07-15 NOTE — PROGRESS NOTES
Preventive Care Visit  [facility], Family Medicine  Jul 15, 2024      Assessment & Plan   Routine general medical examination at a health care facility    RUQ abdominal pain  - CBC with Platelets & Differential  - Comprehensive metabolic panel  - TSH WITH FREE T4 REFLEX  - Amylase  - Tissue transglutaminase kalyan IgA and IgG  - US Abdomen Complete  - Reduce EOTH as discussed    Screening for heart disease  - Lipid panel reflex to direct LDL Fasting    Generalized anxiety disorder  - sertraline (ZOLOFT) 100 MG tablet  Dispense: 90 tablet; Refill: 1  - hydrOXYzine HCl (ATARAX) 25 MG tablet  Dispense: 30 tablet; Refill: 1  - Vitamin D Deficiency    Need for hepatitis C screening test  - Hepatitis C Screen Reflex to HCV RNA Quant and Genotype    Cervical cancer screening  Discussed, pt declines today but agrees to complete at follow up    Concentration deficit  - Adult Mental Health  Referral    Counseling  Appropriate preventive services were discussed with this patient, including applicable screening as appropriate for fall prevention, nutrition, physical activity, Tobacco-use cessation, weight loss and cognition.  Checklist reviewing preventive services available has been given to the patient.  Reviewed patient's diet, addressing concerns and/or questions.   The patient was instructed to see the dentist every 6 months.   The patient reports drinking more than 3 alcoholic drinks per day and/or more than 7 drhnks per week. The patient was counseled and given information about possible harmful effects of excessive alcohol intake.The patient's PHQ-9 score is consistent with moderate depression. She was provided with information regarding depression.     Follow up in 2-4 weeks for PAP and POC development       Frantz Moreno is a 34 year old, presenting for the following:  Physical (annual)    Pt reports for routine annual and several acute concerns.     Pt has  noted RUQ abd pain for approx 2 months. Describes as mild, aching, intermittent, not correlated to PO intake. Reports drinking approx 1 bottle of wine 3-5 nights per week to 'unwind and assist with sleep'. This was discussed.   Denies- fever, N/V, SOB/CP, dietary changes, recent travel/illness, heart burn, bloating.  Endorses- frequent diarrhea for approx 2-3 months. Reports she often needs to have BM shortly after eating. Not painful, does not improve abd pain. Non bloody.       Pt additionally concerned for ADHD testing. Reports she has been referred several times but has not followed through with scheduling.     Elevated PHQ9 discussed, pt reports she feels depression is well controlled on current medications. Additional therapies reviewed- pt declines at this time but will consider in future.         7/15/2024     1:25 PM   Additional Questions   Roomed by refugio        Health Care Directive  Patient does not have a Health Care Directive or Living Will: Discussed advance care planning with patient; however, patient declined at this time.    HPI        7/15/2024   General Health   How would you rate your overall physical health? (!) FAIR   Feel stress (tense, anxious, or unable to sleep) Rather much      (!) STRESS CONCERN      7/15/2024   Nutrition   Three or more servings of calcium each day? (!) NO   Diet: Regular (no restrictions)   How many servings of fruit and vegetables per day? (!) 2-3   How many sweetened beverages each day? 0-1            7/15/2024   Exercise   Days per week of moderate/strenous exercise 5 days            7/15/2024   Social Factors   Frequency of gathering with friends or relatives Once a week   Worry food won't last until get money to buy more No   Food not last or not have enough money for food? Yes   Do you have housing? (Housing is defined as stable permanent housing and does not include staying ouside in a car, in a tent, in an abandoned building, in an overnight shelter, or  couch-surfing.) Yes   Are you worried about losing your housing? Yes   Lack of transportation? No   Unable to get utilities (heat,electricity)? No   Want help with housing or utility concern? No            7/15/2024   Dental   Dentist two times every year? (!) NO            7/15/2024   TB Screening   Were you born outside of the [place]? No          Today's PHQ-9 Score:       7/15/2024    12:59 PM   PHQ-9 SCORE   PHQ-9 Total Score MyChart 12 (Moderate depression)   PHQ-9 Total Score 12         7/15/2024   Substance Use   Alcohol more than 3/day or more than 7/wk Yes   How often do you have a drink containing alcohol 4 or more times a week   How many alcohol drinks on typical day 3 or 4   How often do you have 5+ drinks at one occasion Weekly   Audit 2/3 Score 4   How often not able to stop drinking once started Weekly   How often failed to do what normally expected Monthly   How often needed first drink in am after a heavy drinking session Monthly   How often feeling of guilt or remorse after drinking Monthly   How often unable to remember what happened the night before Never   Have you or someone else been injured because of your drinking No   Has anyone been concerned or suggested you cut down on drinking Yes, but not in the last year   TOTAL SCORE - AUDIT 19   Do you use any other substances recreationally? (!) ALCOHOL    (!) CANNABIS PRODUCTS       Multiple values from one day are sorted in reverse-chronological order     Social History     Tobacco Use    Smoking status: Never    Smokeless tobacco: Never   Substance Use Topics    Alcohol use: Yes     Comment: 1 bottle of wine    Drug use: Yes         7/15/2024   STI Screening   New sexual partner(s) since last STI/HIV test? (!) YES         History of abnormal Pap smear: YES - other categories         Latest Ref Rng & Units 9/30/2020     9:02 AM 9/30/2020     9:00 AM 7/3/2019     8:51 AM   PAP / HPV   PAP (Historical)  ASC-US   ASC-US    HPV 16 DNA NEG^Negative   "Negative     HPV 18 DNA NEG^Negative  Negative     Other HR HPV NEG^Negative  Positive             7/15/2024   Contraception/Family Planning   Questions about contraception or family planning No        Review of Systems  Constitutional, HEENT, cardiovascular, pulmonary, gi and gu systems are negative, except as otherwise noted.     Objective    Exam  /86   Pulse 78   Temp 98  F (36.7  C) (Temporal)   Resp 16   Ht 1.785 m (5' 10.28\")   Wt 89.8 kg (198 lb)   LMP 07/14/2024 (Exact Date)   SpO2 97%   BMI 28.19 kg/m     Estimated body mass index is 28.19 kg/m  as calculated from the following:    Height as of this encounter: 1.785 m (5' 10.28\").    Weight as of this encounter: 89.8 kg (198 lb).    Physical Exam  GENERAL: alert and no distress  NECK: no adenopathy, no asymmetry, masses, or scars  RESP: lungs clear to auscultation - no rales, rhonchi or wheezes  CV: regular rate and rhythm, normal S1 S2, no S3 or S4, no murmur, click or rub, no peripheral edema  ABDOMEN: soft, nontender, no hepatosplenomegaly, no masses and bowel sounds normal  MS: no gross musculoskeletal defects noted, no edema        Signed Electronically by: MIMI Pak CNP    Answers submitted by the patient for this visit:  Patient Health Questionnaire (Submitted on 7/15/2024)  If you checked off any problems, how difficult have these problems made it for you to do your work, take care of things at home, or get along with other people?: Somewhat difficult  PHQ9 TOTAL SCORE: 12    "

## 2024-07-16 LAB
ALBUMIN SERPL BCG-MCNC: 4.6 G/DL (ref 3.5–5.2)
ALP SERPL-CCNC: 65 U/L (ref 40–150)
ALT SERPL W P-5'-P-CCNC: 15 U/L (ref 0–50)
AMYLASE SERPL-CCNC: 36 U/L (ref 28–100)
ANION GAP SERPL CALCULATED.3IONS-SCNC: 12 MMOL/L (ref 7–15)
AST SERPL W P-5'-P-CCNC: 23 U/L (ref 0–45)
BILIRUB SERPL-MCNC: 0.4 MG/DL
BUN SERPL-MCNC: 6.8 MG/DL (ref 6–20)
CALCIUM SERPL-MCNC: 9.2 MG/DL (ref 8.8–10.4)
CHLORIDE SERPL-SCNC: 101 MMOL/L (ref 98–107)
CHOLEST SERPL-MCNC: 229 MG/DL
CREAT SERPL-MCNC: 0.71 MG/DL (ref 0.51–0.95)
EGFRCR SERPLBLD CKD-EPI 2021: >90 ML/MIN/1.73M2
FASTING STATUS PATIENT QL REPORTED: NO
FASTING STATUS PATIENT QL REPORTED: NO
GLUCOSE SERPL-MCNC: 79 MG/DL (ref 70–99)
HCO3 SERPL-SCNC: 24 MMOL/L (ref 22–29)
HCV AB SERPL QL IA: NONREACTIVE
HDLC SERPL-MCNC: 76 MG/DL
LDLC SERPL CALC-MCNC: 123 MG/DL
NONHDLC SERPL-MCNC: 153 MG/DL
POTASSIUM SERPL-SCNC: 4 MMOL/L (ref 3.4–5.3)
PROT SERPL-MCNC: 7.5 G/DL (ref 6.4–8.3)
SODIUM SERPL-SCNC: 137 MMOL/L (ref 135–145)
TRIGL SERPL-MCNC: 148 MG/DL
TSH SERPL DL<=0.005 MIU/L-ACNC: 1.16 UIU/ML (ref 0.3–4.2)
TTG IGA SER-ACNC: 0.3 U/ML
TTG IGG SER-ACNC: <0.6 U/ML
VIT D+METAB SERPL-MCNC: 28 NG/ML (ref 20–50)

## 2024-07-16 NOTE — PATIENT INSTRUCTIONS
Patient Education   Preventive Care Advice   This is general advice given by our system to help you stay healthy. However, your care team may have specific advice just for you. Please talk to your care team about your preventive care needs.  Nutrition  Eat 5 or more servings of fruits and vegetables each day.  Try wheat bread, brown rice and whole grain pasta (instead of white bread, rice, and pasta).  Get enough calcium and vitamin D. Check the label on foods and aim for 100% of the RDA (recommended daily allowance).  Lifestyle  Exercise at least 150 minutes each week  (30 minutes a day, 5 days a week).  Do muscle strengthening activities 2 days a week. These help control your weight and prevent disease.  No smoking.  Wear sunscreen to prevent skin cancer.  Have a dental exam and cleaning every 6 months.  Yearly exams  See your health care team every year to talk about:  Any changes in your health.  Any medicines your care team has prescribed.  Preventive care, family planning, and ways to prevent chronic diseases.  Shots (vaccines)   HPV shots (up to age 26), if you've never had them before.  Hepatitis B shots (up to age 59), if you've never had them before.  COVID-19 shot: Get this shot when it's due.  Flu shot: Get a flu shot every year.  Tetanus shot: Get a tetanus shot every 10 years.  Pneumococcal, hepatitis A, and RSV shots: Ask your care team if you need these based on your risk.  Shingles shot (for age 50 and up)  General health tests  Diabetes screening:  Starting at age 35, Get screened for diabetes at least every 3 years.  If you are younger than age 35, ask your care team if you should be screened for diabetes.  Cholesterol test: At age 39, start having a cholesterol test every 5 years, or more often if advised.  Bone density scan (DEXA): At age 50, ask your care team if you should have this scan for osteoporosis (brittle bones).  Hepatitis C: Get tested at least once in your life.  STIs (sexually  transmitted infections)  Before age 24: Ask your care team if you should be screened for STIs.  After age 24: Get screened for STIs if you're at risk. You are at risk for STIs (including HIV) if:  You are sexually active with more than one person.  You don't use condoms every time.  You or a partner was diagnosed with a sexually transmitted infection.  If you are at risk for HIV, ask about PrEP medicine to prevent HIV.  Get tested for HIV at least once in your life, whether you are at risk for HIV or not.  Cancer screening tests  Cervical cancer screening: If you have a cervix, begin getting regular cervical cancer screening tests starting at age 21.  Breast cancer scan (mammogram): If you've ever had breasts, begin having regular mammograms starting at age 40. This is a scan to check for breast cancer.  Colon cancer screening: It is important to start screening for colon cancer at age 45.  Have a colonoscopy test every 10 years (or more often if you're at risk) Or, ask your provider about stool tests like a FIT test every year or Cologuard test every 3 years.  To learn more about your testing options, visit:   .  For help making a decision, visit:   https://bit.ly/sz73378.  Prostate cancer screening test: If you have a prostate, ask your care team if a prostate cancer screening test (PSA) at age 55 is right for you.  Lung cancer screening: If you are a current or former smoker ages 50 to 80, ask your care team if ongoing lung cancer screenings are right for you.  For informational purposes only. Not to replace the advice of your health care provider. Copyright   2023 [facility]. All rights reserved. Clinically reviewed by the [facility]. [employer]yn 093874 - REV 01/24.  Learning About Stress  What is stress?     Stress is your body's response to a hard situation. Your body can have a physical, emotional, or mental response. Stress is a fact of life for most people, and it  affects everyone differently. What causes stress for you may not be stressful for someone else.  A lot of things can cause stress. You may feel stress when you go on a job interview, take a test, or run a race. This kind of short-term stress is normal and even useful. It can help you if you need to work hard or react quickly. For example, stress can help you finish an important job on time.  Long-term stress is caused by ongoing stressful situations or events. Examples of long-term stress include long-term health problems, ongoing problems at work, or conflicts in your family. Long-term stress can harm your health.  How does stress affect your health?  When you are stressed, your body responds as though you are in danger. It makes hormones that speed up your heart, make you breathe faster, and give you a burst of energy. This is called the fight-or-flight stress response. If the stress is over quickly, your body goes back to normal and no harm is done.  But if stress happens too often or lasts too long, it can have bad effects. Long-term stress can make you more likely to get sick, and it can make symptoms of some diseases worse. If you tense up when you are stressed, you may develop neck, shoulder, or low back pain. Stress is linked to high blood pressure and heart disease.  Stress also harms your emotional health. It can make you sanon, tense, or depressed. Your relationships may suffer, and you may not do well at work or school.  What can you do to manage stress?  You can try these things to help manage stress:   Do something active. Exercise or activity can help reduce stress. Walking is a great way to get started. Even everyday activities such as housecleaning or yard work can help.  Try yoga or kg chi. These techniques combine exercise and meditation. You may need some training at first to learn them.  Do something you enjoy. For example, listen to music or go to a movie. Practice your hobby or do volunteer  "work.  Meditate. This can help you relax, because you are not worrying about what happened before or what may happen in the future.  Do guided imagery. Imagine yourself in any setting that helps you feel calm. You can use online videos, books, or a teacher to guide you.  Do breathing exercises. For example:  From a standing position, bend forward from the waist with your knees slightly bent. Let your arms dangle close to the floor.  Breathe in slowly and deeply as you return to a standing position. Roll up slowly and lift your head last.  Hold your breath for just a few seconds in the standing position.  Breathe out slowly and bend forward from the waist.  Let your feelings out. Talk, laugh, cry, and express anger when you need to. Talking with supportive friends or family, a counselor, or a thierry leader about your feelings is a healthy way to relieve stress. Avoid discussing your feelings with people who make you feel worse.  Write. It may help to write about things that are bothering you. This helps you find out how much stress you feel and what is causing it. When you know this, you can find better ways to cope.  What can you do to prevent stress?  You might try some of these things to help prevent stress:  Manage your time. This helps you find time to do the things you want and need to do.  Get enough sleep. Your body recovers from the stresses of the day while you are sleeping.  Get support. Your family, friends, and community can make a difference in how you experience stress.  Limit your news feed. Avoid or limit time on social media or news that may make you feel stressed.  Do something active. Exercise or activity can help reduce stress. Walking is a great way to get started.  Where can you learn more?  Go to https://www.healthwise.net/patiented  Enter N032 in the search box to learn more about \"Learning About Stress.\"  Current as of: October 24, 2023               Content Version: 14.0    9705-6971 " [employer].   Care instructions adapted under license by your healthcare professional. If you have questions about a medical condition or this instruction, always ask your healthcare professional. [employer] disclaims any warranty or liability for your use of this information.      Learning About Depression Screening  What is depression screening?  Depression screening is a way to see if you have depression symptoms. It may be done by a doctor or counselor. It's often part of a routine checkup. That's because your mental health is just as important as your physical health.  Depression is a mental health condition that affects how you feel, think, and act. You may:  Have less energy.  Lose interest in your daily activities.  Feel sad and grouchy for a long time.  Depression is very common. It affects people of all ages.  Many things can lead to depression. Some people become depressed after they have a stroke or find out they have a major illness like cancer or heart disease. The death of a loved one or a breakup may lead to depression. It can run in families. Most experts believe that a combination of inherited genes and stressful life events can cause it.  What happens during screening?  You may be asked to fill out a form about your depression symptoms. You and the doctor will discuss your answers. The doctor may ask you more questions to learn more about how you think, act, and feel.  What happens after screening?  If you have symptoms of depression, your doctor will talk to you about your options.  Doctors usually treat depression with medicines or counseling. Often, combining the two works best. Many people don't get help because they think that they'll get over the depression on their own. But people with depression may not get better unless they get treatment.  The cause of depression is not well understood. There may be many factors involved. But if you have depression, it's not  "your fault.  A serious symptom of depression is thinking about death or suicide. If you or someone you care about talks about this or about feeling hopeless, get help right away.  It's important to know that depression can be treated. Medicine, counseling, and self-care may help.  Where can you learn more?  Go to https://www.[employer]ru[employer].net/patiented  Enter T185 in the search box to learn more about \"Learning About Depression Screening.\"  Current as of: June 24, 2023               Content Version: 14.0    4030-3153 [employer].   Care instructions adapted under license by your healthcare professional. If you have questions about a medical condition or this instruction, always ask your healthcare professional. [employer] disclaims any warranty or liability for your use of this information.      9 Ways to Cut Back on Drinking  Maybe you've found yourself drinking more alcohol than you'd prefer. If you want to cut back, here are some ideas to try.    Think before you drink.  Do you really want a drink, or is it just a habit? If you're used to having a drink at a certain time, try doing something else then.     Look for substitutes.  Find some no-alcohol drinks that you enjoy, like flavored seltzer water, tea with honey, or tonic with a slice of lime. Or try alcohol-free beer or \"virgin\" cocktails (without the alcohol).     Drink more water.  Use water to quench your thirst. Drink a glass of water before you have any alcohol. Have another glass along with every drink or between drinks.     Shrink your drink.  For example, have a bottle of beer instead of a pint. Use a smaller glass for wine. Choose drinks with lower alcohol content (ABV%). Or use less liquor and more mixer in cocktails.     Slow down.  It's easy to drink quickly and without thinking about it. Pay attention, and make each drink last longer.     Do the math.  Total up how much you spend on alcohol each month. How much is that a " "year? If you cut back, what could you do with the money you save?     Take a break.  Choose a day or two each week when you won't drink at all. Notice how you feel on those days, physically and emotionally. How did you sleep? Do you feel better? Over time, add more break days.     Count calories.  Would you like to lose some weight? For some people that's a good motivator for cutting back. Figure out how many calories are in each drink. How many does that add up to in a day? In a week? In a month?     Practice saying no.  Be ready when someone offers you a drink. Try: \"Thanks, I've had enough.\" Or \"Thanks, but I'm cutting back.\" Or \"No, thanks. I feel better when I drink less.\"   Current as of: November 15, 2023               Content Version: 14.0    9449-7995 [email or web address].   Care instructions adapted under license by your healthcare professional. If you have questions about a medical condition or this instruction, always ask your healthcare professional. [email or web address] disclaims any warranty or liability for your use of this information.    Substance Use Disorder: Care Instructions  Overview     You can improve your life and health by stopping your use of alcohol or drugs. When you don't drink or use drugs, you may feel and sleep better. You may get along better with your family, friends, and coworkers. There are medicines and programs that can help with substance use disorder.  How can you care for yourself at home?  Here are some ways to help you stay sober and prevent relapse.  If you have been given medicine to help keep you sober or reduce your cravings, be sure to take it exactly as prescribed.  Talk to your doctor about programs that can help you stop using drugs or drinking alcohol.  Do not keep alcohol or drugs in your home.  Plan ahead. Think about what you'll say if other people ask you to drink or use drugs. Try not to spend time with people who drink or use drugs.  Use the time " and money spent on drinking or drugs to do something that's important to you.  Preventing a relapse  Have a plan to deal with relapse. Learn to recognize changes in your thinking that lead you to drink or use drugs. Get help before you start to drink or use drugs again.  Try to stay away from situations, friends, or places that may lead you to drink or use drugs.  If you feel the need to drink alcohol or use drugs again, seek help right away. Call a trusted friend or family member. Some people get support from organizations such as Narcotics Anonymous or [employer] or from treatment facilities.  If you relapse, get help as soon as you can. Some people make a plan with another person that outlines what they want that person to do for them if they relapse. The plan usually includes how to handle the relapse and who to notify in case of relapse.  Don't give up. Remember that a relapse doesn't mean that you have failed. Use the experience to learn the triggers that lead you to drink or use drugs. Then quit again. Recovery is a lifelong process. Many people have several relapses before they are able to quit for good.  Follow-up care is a key part of your treatment and safety. Be sure to make and go to all appointments, and call your doctor if you are having problems. It's also a good idea to know your test results and keep a list of the medicines you take.  When should you call for help?   Call 911  anytime you think you may need emergency care. For example, call if you or someone else:    Has overdosed or has withdrawal signs. Be sure to tell the emergency workers that you are or someone else is using or trying to quit using drugs. Overdose or withdrawal signs may include:  Losing consciousness.  Seizure.  Seeing or hearing things that aren't there (hallucinations).     Is thinking or talking about suicide or harming others.   Where to get help 24 hours a day, 7 days a week   If you or someone you know talks about  "suicide, self-harm, a mental health crisis, a substance use crisis, or any other kind of emotional distress, get help right away. You can:    Call the Suicide and Crisis Lifeline at 988.     Call 2-773-807-TALK (1-151.156.2507).     Text HOME to 089803 to access the Crisis Text Line.   Consider saving these numbers in your phone.  Go to [employer] for more information or to chat online.  Call your doctor now or seek immediate medical care if:    You are having withdrawal symptoms. These may include nausea or vomiting, sweating, shakiness, and anxiety.   Watch closely for changes in your health, and be sure to contact your doctor if:    You have a relapse.     You need more help or support to stop.   Where can you learn more?  Go to https://www.Podotree.net/patiented  Enter H573 in the search box to learn more about \"Substance Use Disorder: Care Instructions.\"  Current as of: November 15, 2023               Content Version: 14.0    0378-1050 [email or web address].   Care instructions adapted under license by your healthcare professional. If you have questions about a medical condition or this instruction, always ask your healthcare professional. [email or web address] disclaims any warranty or liability for your use of this information.      Safer Sex: Care Instructions  Overview  Safer sex is a way to reduce your risk of getting a sexually transmitted infection (STI). It can also help prevent pregnancy.  Several products can help you practice safer sex and reduce your chance of STIs. One of the best is a condom. There are internal and external condoms. You can use a special rubber sheet (dental dam) for protection during oral sex. Disposable gloves can keep your hands from touching blood, semen, or other body fluids that can carry infections.  Remember that birth control methods such as diaphragms, IUDs, foams, and birth control pills do not stop you from getting STIs.  Follow-up care is a key part of " your treatment and safety. Be sure to make and go to all appointments, and call your doctor if you are having problems. It's also a good idea to know your test results and keep a list of the medicines you take.  How can you care for yourself at home?  Think about getting vaccinated to help prevent hepatitis A, hepatitis B, and human papillomavirus (HPV). They can be spread through sex.  Use a condom every time you have sex. Use an external condom, which goes on the penis. Or use an internal condom, which goes into the vagina or anus.  Make sure you use the right size external condom. A condom that's too small can break easily. A condom that's too big can slip off during sex.  Use a new condom each time you have sex. Be careful not to poke a hole in the condom when you open the wrapper.  Don't use an internal condom and an external condom at the same time.  Never use petroleum jelly (such as Vaseline), grease, hand lotion, baby oil, or anything with oil in it. These products can make holes in the condom.  After intercourse, hold the edge of the condom as you remove it. This will help keep semen from spilling out of the condom.  Do not have sex with anyone who has symptoms of an STI, such as sores on the genitals or mouth.  Do not drink a lot of alcohol or use drugs before sex.  Limit your sex partners. Sex with one partner who has sex only with you can reduce your risk of getting an STI.  Don't share sex toys. But if you do share them, use a condom and clean the sex toys between each use.  Talk to any partners before you have sex. Talk about what you feel comfortable with and whether you have any boundaries with sex. And find out if your partner or partners may be at risk for any STI. Keep in mind that a person may be able to spread an STI even if they do not have symptoms. You and any partners may want to get tested for STIs.  Where can you learn more?  Go to https://www.healthwise.net/patiented  Enter B608 in the  "search box to learn more about \"Safer Sex: Care Instructions.\"  Current as of: November 27, 2023               Content Version: 14.0    0027-9271 [employer].   Care instructions adapted under license by your healthcare professional. If you have questions about a medical condition or this instruction, always ask your healthcare professional. [employer] disclaims any warranty or liability for your use of this information.         "

## 2025-01-19 NOTE — PROGRESS NOTES
"S: Tiffanie Benito is a 34 year old female who presents to [facility] for a Pap--overdue.  Patient would like Pap only and STI testing today (swabs only no blood work)  She is a new pt to the [facility].   Pt had a full physical with her PCP on 2024.  (Patient would like to establish care with us as midwives and will come see us in the summer for her full physical/annual exam and blood work)    Last Pap: 20 ASCUS pap, + HR HPV (not 16 or 18).   20 [place] Bx - GRETA 1, ECC - no GRETA. Cotest due in 1 year.   22 Lost to follow-up for pap tracking     Patient occupation: Works at the [employer] as a [employer]  Medications: On 100 mg of Zoloft daily, topiramax x 2 weeks  Pplease see past medical history section.  Includes abnormal Pap, HPV infection, colposcopy, depression.  Surgical history includes  in   Please see family history section.  Needs review.  General health/lifestyle:   Patient states she always wears a seatbelt.  There are no firearms in the home   There are working fire detectors in the home.  No smoking or tobacco use.   In a typical week, exercise includes: 2-3 times a week walking and going to the gym  Diet: N/A  In a typical week she drinks 12 alcoholic drinks..  Discussed that this is considered moderate to heavy drinking.  Patient knows that she \"drinks too much\".  She states that she started the Topamax that helps her with this craving.  She is working hard to try to get her health back in order.  No recreational drug use.   No physical, sexual or emotional abuse.   She is in a safe, monogamous relationship with a boyfriend times several months.   Sexual history/family planning: Not planning any pregnancy in the next year  Children: Patient has a son 16 and a daughter 13  For birth control she uses: Uses a calendar/rhythm method.  She has used this method for years.  (Understands warnings)  Patient is sexually " active. In the last 3 months she has had 1 partner, in the last 12 months she has had 3 partners.  Her partners are male.    Types of sexual activity practiced: Vaginal, oral  Last menstrual period: 2024  Her periods come every 28 days lasting 5 days.  They are usually moderate to heavy flow, crampy.  PMS includes mood swings, bloating, cramping, headaches.  Patient wants STI testing today, but only swabs for GC chlamydia, wet prep, no blood work.    Review of systems: N/A    Immunization History   Administered Date(s) Administered    COVID-19 MONOVALENT 12+ (Pfizer) 2021, 2021    Flu, Unspecified 2012    Hepatitis B, Peds 2003, 2003, 2004    Influenza (IIV3) PF 2011, 10/16/2013    Influenza Vaccine >6 months,quad, PF 2011, 2012, 10/16/2013, 10/07/2014, 10/27/2016, 11/15/2019, 10/27/2021, 10/26/2022, 2023    Influenza, Whole Virus 10/07/2014    TDAP (Adacel,Boostrix) 07/15/2024    TDAP Vaccine (Boostrix) 2013     Gynecologic Hx  No LMP recorded.  Contraception: Rhythm method, natural family planning (NFP)    OB History    Para Term  AB Living   2 0 0 0 0 2   SAB IAB Ectopic Multiple Live Births   0 0 0 0 2      # Outcome Date GA Lbr Adonay/2nd Weight Sex Type Anes PTL Lv   2  11   3.629 kg (8 lb) F  EPI  CHRISTIAN   1  08   3.175 kg (7 lb) M CS-LTranv Spinal  CHRISTIAN       Current Outpatient Medications   Medication Sig Dispense Refill    hydrOXYzine HCl (ATARAX) 25 MG tablet Take 1-2 tablets (25-50 mg) by mouth every 8 hours as needed for anxiety 30 tablet 1    sertraline (ZOLOFT) 100 MG tablet Take 1 tablet (100 mg) by mouth daily 90 tablet 1     Past Medical History:   Diagnosis Date    ASCUS with positive high risk HPV cervical 12/15/2006, 7/3/19    9/30/20    H/O colposcopy with cervical biopsy 2007    GRETA 1     Past Surgical History:   Procedure Laterality Date    C/SECTION, LOW TRANSVERSE  2008      Patient has no known allergies.  Family History   Problem Relation Age of Onset    Alcoholism Mother     Depression Mother     No Known Problems Father     Depression Maternal Grandmother     No Known Problems Brother     No Known Problems Brother      Social History     Socioeconomic History    Marital status: Single     Spouse name: Not on file    Number of children: 2    Years of education: 16    Highest education level: Not on file   Occupational History    Not on file   Tobacco Use    Smoking status: Never    Smokeless tobacco: Never   Substance and Sexual Activity    Alcohol use: Yes     Comment: 1 bottle of wine    Drug use: Yes    Sexual activity: Yes     Partners: Male     Birth control/protection: Pill   Other Topics Concern    Parent/sibling w/ CABG, MI or angioplasty before 65F 55M? Not Asked   Social History Narrative    Not on file     Social Drivers of Health     Financial Resource Strain: Low Risk  (7/15/2024)    Financial Resource Strain     Within the past 12 months, have you or your family members you live with been unable to get utilities (heat, electricity) when it was really needed?: No   Food Insecurity: High Risk (7/15/2024)    Food Insecurity     Within the past 12 months, did you worry that your food would run out before you got money to buy more?: Yes     Within the past 12 months, did the food you bought just not last and you didn t have money to get more?: No   Transportation Needs: Low Risk  (7/15/2024)    Transportation Needs     Within the past 12 months, has lack of transportation kept you from medical appointments, getting your medicines, non-medical meetings or appointments, work, or from getting things that you need?: No   Physical Activity: Unknown (7/15/2024)    Exercise Vital Sign     Days of Exercise per Week: 5 days     Minutes of Exercise per Session: Not on file   Stress: Stress Concern Present (7/15/2024)    [employer] [place] [employer] - Occupational  Stress Questionnaire     Feeling of Stress : Rather much   Social Connections: Unknown (7/15/2024)    Social Connection and Isolation Panel [NHANES]     Frequency of Communication with Friends and Family: Not on file     Frequency of Social Gatherings with Friends and Family: Once a week     Attends Christianity Services: Not on file     Active Member of Clubs or Organizations: Not on file     Attends Club or Organization Meetings: Not on file     Marital Status: Not on file   Interpersonal Safety: Low Risk  (7/15/2024)    Interpersonal Safety     Do you feel physically and emotionally safe where you currently live?: Yes     Within the past 12 months, have you been hit, slapped, kicked or otherwise physically hurt by someone?: No     Within the past 12 months, have you been humiliated or emotionally abused in other ways by your partner or ex-partner?: No   Housing Stability: High Risk (7/15/2024)    Housing Stability     Do you have housing? : Yes     Are you worried about losing your housing?: Yes           O: Patient is well nourished, well groomed with normal  body habitus.  Respiratory unlabored breathing  Cardiovascular normal perfusion  External genitalia and BUS with no enlargement, no lesions noted.  Vagina has physiologic discharge, rugated present.  (Wet prep obtained for STI testing)  Cervix no lesions not friable  Other aspects of pelvic exam deferred   Psych alert and oriented ×3.  Appropriate judgment, insight, normal affect.    A: Pap only and STI testing.  34 year old yrs old.  -0-1-2    P: 1.  ThinPrep Pap sent with HPV Co-testing.   2.  Labs done at her annual exam 2024.  5.  Exercise discussed.  6.  Lifestyle and stress reduction discussed.  Recommended decrease in alcohol use each week.  7.  multivitamin, calcium, fish oil and vitamin D3 5000 international units recommended.  8.  Referrals given for [facility].  Patient can also use her insurance  information and look for behavioral health therapy in her network.  9.  Contraception discussed--using rhythm method, natural family planning, patient understands proper use, risks and benefits.  10.. Return to clinic  in 7/2025 for annual exam or sooner as needed.    MIMI Mcguire, CNM  Time spent:  Chart review/Pre-charting on 1/22/2025: 5 minutes  Face-to-face visit: 30 minutes;   Documentation: 5 minutes  Total time spent on day of service: 40 minutes new patient

## 2025-01-22 ENCOUNTER — OFFICE VISIT (OUTPATIENT)
Dept: MIDWIFE SERVICES | Facility: CLINIC | Age: 35
End: 2025-01-22
Payer: COMMERCIAL

## 2025-01-22 VITALS
SYSTOLIC BLOOD PRESSURE: 124 MMHG | BODY MASS INDEX: 28.19 KG/M2 | HEART RATE: 79 BPM | OXYGEN SATURATION: 99 % | DIASTOLIC BLOOD PRESSURE: 82 MMHG | WEIGHT: 198 LBS

## 2025-01-22 DIAGNOSIS — Z11.3 SCREENING EXAMINATION FOR STI: ICD-10-CM

## 2025-01-22 DIAGNOSIS — Z01.419 WELL WOMAN EXAM WITH ROUTINE GYNECOLOGICAL EXAM: Primary | ICD-10-CM

## 2025-01-22 DIAGNOSIS — Z87.42 HISTORY OF ABNORMAL CERVICAL PAP SMEAR: ICD-10-CM

## 2025-01-22 DIAGNOSIS — F33.1 MODERATE EPISODE OF RECURRENT MAJOR DEPRESSIVE DISORDER (H): ICD-10-CM

## 2025-01-22 DIAGNOSIS — N87.0 MILD DYSPLASIA OF CERVIX (CIN I): ICD-10-CM

## 2025-01-22 LAB
CLUE CELLS: ABNORMAL
TRICHOMONAS, WET PREP: ABNORMAL
WBC'S/HIGH POWER FIELD, WET PREP: ABNORMAL
YEAST, WET PREP: ABNORMAL

## 2025-01-22 PROCEDURE — 99203 OFFICE O/P NEW LOW 30 MIN: CPT | Performed by: MIDWIFE

## 2025-01-22 PROCEDURE — 87491 CHLMYD TRACH DNA AMP PROBE: CPT | Performed by: MIDWIFE

## 2025-01-22 PROCEDURE — 87591 N.GONORRHOEAE DNA AMP PROB: CPT | Performed by: MIDWIFE

## 2025-01-22 PROCEDURE — 87210 SMEAR WET MOUNT SALINE/INK: CPT | Performed by: MIDWIFE

## 2025-01-22 ASSESSMENT — PATIENT HEALTH QUESTIONNAIRE - PHQ9: SUM OF ALL RESPONSES TO PHQ QUESTIONS 1-9: 9

## 2025-01-23 LAB
C TRACH DNA SPEC QL PROBE+SIG AMP: NEGATIVE
N GONORRHOEA DNA SPEC QL NAA+PROBE: NEGATIVE
SPECIMEN TYPE: NORMAL

## 2025-01-25 LAB
HPV HR 12 DNA CVX QL NAA+PROBE: NEGATIVE
HPV16 DNA CVX QL NAA+PROBE: NEGATIVE
HPV18 DNA CVX QL NAA+PROBE: NEGATIVE
HUMAN PAPILLOMA VIRUS FINAL DIAGNOSIS: NORMAL

## 2025-01-28 LAB
BKR AP ASSOCIATED HPV REPORT: NORMAL
BKR LAB AP GYN ADEQUACY: NORMAL
BKR LAB AP GYN INTERPRETATION: NORMAL
BKR LAB AP LMP: NORMAL
BKR LAB AP PREVIOUS ABNL DX: NORMAL
BKR LAB AP PREVIOUS ABNORMAL: NORMAL
PATH REPORT.COMMENTS IMP SPEC: NORMAL
PATH REPORT.COMMENTS IMP SPEC: NORMAL
PATH REPORT.RELEVANT HX SPEC: NORMAL

## 2025-03-06 DIAGNOSIS — F41.1 GENERALIZED ANXIETY DISORDER: ICD-10-CM

## 2025-03-06 RX ORDER — SERTRALINE HYDROCHLORIDE 100 MG/1
100 TABLET, FILM COATED ORAL DAILY
Qty: 90 TABLET | Refills: 1 | Status: SHIPPED | OUTPATIENT
Start: 2025-03-06

## 2025-06-16 ENCOUNTER — PATIENT OUTREACH (OUTPATIENT)
Dept: CARE COORDINATION | Facility: CLINIC | Age: 35
End: 2025-06-16
Payer: COMMERCIAL

## 2025-06-30 ENCOUNTER — PATIENT OUTREACH (OUTPATIENT)
Dept: CARE COORDINATION | Facility: CLINIC | Age: 35
End: 2025-06-30
Payer: COMMERCIAL

## 2025-08-23 ENCOUNTER — HEALTH MAINTENANCE LETTER (OUTPATIENT)
Age: 35
End: 2025-08-23